# Patient Record
Sex: FEMALE | Race: WHITE | NOT HISPANIC OR LATINO | Employment: OTHER | URBAN - METROPOLITAN AREA
[De-identification: names, ages, dates, MRNs, and addresses within clinical notes are randomized per-mention and may not be internally consistent; named-entity substitution may affect disease eponyms.]

---

## 2023-03-20 DIAGNOSIS — K21.9 GASTROESOPHAGEAL REFLUX DISEASE WITHOUT ESOPHAGITIS: Primary | ICD-10-CM

## 2023-03-27 DIAGNOSIS — I10 BENIGN ESSENTIAL HTN: ICD-10-CM

## 2023-03-27 DIAGNOSIS — F41.9 ANXIETY: Primary | ICD-10-CM

## 2023-03-27 RX ORDER — DIAZEPAM 5 MG/1
5 TABLET ORAL NIGHTLY PRN
COMMUNITY
Start: 2021-06-10 | End: 2023-03-27 | Stop reason: SDUPTHER

## 2023-03-30 PROBLEM — E01.0 THYROMEGALY: Status: ACTIVE | Noted: 2023-03-30

## 2023-03-30 PROBLEM — E78.49 ESSENTIAL FAMILIAL HYPERLIPIDEMIA: Status: ACTIVE | Noted: 2023-03-30

## 2023-03-30 PROBLEM — M47.819 FACET ARTHROPATHY: Status: ACTIVE | Noted: 2023-03-30

## 2023-03-30 PROBLEM — K57.32 DIVERTICULITIS OF COLON: Status: ACTIVE | Noted: 2023-03-30

## 2023-03-30 PROBLEM — K20.90 ESOPHAGITIS: Status: ACTIVE | Noted: 2023-03-30

## 2023-03-30 PROBLEM — J32.9 SINUSITIS: Status: ACTIVE | Noted: 2023-03-30

## 2023-03-30 PROBLEM — E04.1 THYROID NODULE, COLD: Status: ACTIVE | Noted: 2023-03-30

## 2023-03-30 PROBLEM — F41.9 ANXIETY: Status: ACTIVE | Noted: 2023-03-30

## 2023-03-30 PROBLEM — M54.50 LOW BACK PAIN: Status: ACTIVE | Noted: 2023-03-30

## 2023-03-30 PROBLEM — G47.00 INSOMNIA: Status: ACTIVE | Noted: 2023-03-30

## 2023-03-30 PROBLEM — R07.89 CHEST DISCOMFORT: Status: ACTIVE | Noted: 2023-03-30

## 2023-03-30 PROBLEM — M25.511 ARTHRALGIA OF SHOULDER REGION, RIGHT: Status: ACTIVE | Noted: 2023-03-30

## 2023-03-30 PROBLEM — M79.10 MYALGIA: Status: ACTIVE | Noted: 2023-03-30

## 2023-03-30 PROBLEM — K21.9 GERD (GASTROESOPHAGEAL REFLUX DISEASE): Status: ACTIVE | Noted: 2023-03-30

## 2023-03-30 PROBLEM — M77.8 SHOULDER TENDONITIS: Status: ACTIVE | Noted: 2023-03-30

## 2023-03-30 PROBLEM — J30.9 ALLERGIC RHINITIS: Status: ACTIVE | Noted: 2023-03-30

## 2023-03-30 PROBLEM — I10 BENIGN ESSENTIAL HTN: Status: ACTIVE | Noted: 2023-03-30

## 2023-03-30 PROBLEM — R06.00 DYSPNEA: Status: ACTIVE | Noted: 2023-03-30

## 2023-03-30 PROBLEM — R07.89 ATYPICAL CHEST PAIN: Status: ACTIVE | Noted: 2023-03-30

## 2023-03-30 PROBLEM — R09.81 SINUS CONGESTION: Status: ACTIVE | Noted: 2023-03-30

## 2023-03-30 PROBLEM — R06.09 DYSPNEA ON EXERTION: Status: ACTIVE | Noted: 2023-03-30

## 2023-03-30 PROBLEM — E04.2 MULTINODULAR GOITER: Status: ACTIVE | Noted: 2023-03-30

## 2023-03-30 PROBLEM — L21.9 SEBORRHEA: Status: ACTIVE | Noted: 2023-03-30

## 2023-03-30 PROBLEM — R51.9 FREQUENT HEADACHES: Status: ACTIVE | Noted: 2023-03-30

## 2023-03-30 PROBLEM — R20.9 COLD SENSATION OF SKIN: Status: ACTIVE | Noted: 2023-03-30

## 2023-03-30 PROBLEM — M54.30 SCIATICA: Status: ACTIVE | Noted: 2023-03-30

## 2023-03-30 RX ORDER — DULOXETIN HYDROCHLORIDE 20 MG/1
1 CAPSULE, DELAYED RELEASE ORAL DAILY
COMMUNITY
Start: 2022-06-06 | End: 2023-08-24 | Stop reason: ALTCHOICE

## 2023-03-30 RX ORDER — IBUPROFEN 600 MG/1
1 TABLET ORAL DAILY
COMMUNITY
Start: 2017-01-04

## 2023-03-30 RX ORDER — OMEPRAZOLE 40 MG/1
1 CAPSULE, DELAYED RELEASE ORAL 2 TIMES DAILY
COMMUNITY
Start: 2014-07-28

## 2023-03-30 RX ORDER — PRAVASTATIN SODIUM 10 MG/1
1 TABLET ORAL DAILY
COMMUNITY
Start: 2018-11-14 | End: 2023-11-07 | Stop reason: SDUPTHER

## 2023-03-30 RX ORDER — LISINOPRIL 10 MG/1
1 TABLET ORAL DAILY
COMMUNITY
Start: 2015-11-18 | End: 2024-03-08 | Stop reason: SDUPTHER

## 2023-03-30 RX ORDER — HYDROCHLOROTHIAZIDE 25 MG/1
1 TABLET ORAL DAILY
COMMUNITY
Start: 2021-04-02 | End: 2023-06-14 | Stop reason: SDUPTHER

## 2023-03-30 RX ORDER — FLUTICASONE PROPIONATE 50 MCG
1 SPRAY, SUSPENSION (ML) NASAL 2 TIMES DAILY
COMMUNITY
End: 2024-06-06 | Stop reason: WASHOUT

## 2023-03-30 RX ORDER — HYDROCORTISONE 25 MG/G
CREAM TOPICAL
COMMUNITY
Start: 2021-07-29 | End: 2024-06-06 | Stop reason: WASHOUT

## 2023-03-31 ENCOUNTER — OFFICE VISIT (OUTPATIENT)
Dept: PRIMARY CARE | Facility: CLINIC | Age: 88
End: 2023-03-31
Payer: MEDICARE

## 2023-03-31 VITALS — SYSTOLIC BLOOD PRESSURE: 133 MMHG | RESPIRATION RATE: 12 BRPM | DIASTOLIC BLOOD PRESSURE: 76 MMHG | HEART RATE: 74 BPM

## 2023-03-31 DIAGNOSIS — K21.9 GASTROESOPHAGEAL REFLUX DISEASE WITHOUT ESOPHAGITIS: ICD-10-CM

## 2023-03-31 DIAGNOSIS — I10 BENIGN ESSENTIAL HTN: Primary | ICD-10-CM

## 2023-03-31 DIAGNOSIS — Z00.00 HEALTH CARE MAINTENANCE: ICD-10-CM

## 2023-03-31 DIAGNOSIS — H91.90 HEARING LOSS, UNSPECIFIED HEARING LOSS TYPE, UNSPECIFIED LATERALITY: ICD-10-CM

## 2023-03-31 PROCEDURE — 3075F SYST BP GE 130 - 139MM HG: CPT | Performed by: INTERNAL MEDICINE

## 2023-03-31 PROCEDURE — 3078F DIAST BP <80 MM HG: CPT | Performed by: INTERNAL MEDICINE

## 2023-03-31 PROCEDURE — 99213 OFFICE O/P EST LOW 20 MIN: CPT | Performed by: INTERNAL MEDICINE

## 2023-03-31 RX ORDER — DILTIAZEM HYDROCHLORIDE 120 MG/1
120 CAPSULE, COATED, EXTENDED RELEASE ORAL DAILY
Qty: 30 CAPSULE | Refills: 5 | Status: SHIPPED
Start: 2023-03-31 | End: 2023-04-01 | Stop reason: SDUPTHER

## 2023-03-31 NOTE — PROGRESS NOTES
OARRS:  No data recorded  I have personally reviewed the OARRS report for Elina Mathis. I have considered the risks of abuse, dependence, addiction and diversion    Is the patient prescribed a combination of a benzodiazepine and opioid?  No    Last Urine Drug Screen / ordered today: Yes  Recent Results (from the past 57474 hour(s))   Benzodiazepine Confirmation, Urine    Collection Time: 11/15/22  2:31 PM   Result Value Ref Range    7-Aminoclonazepam <25 Cutoff <25 ng/mL    Alpha-Hydroxyalprazolam <25 Cutoff <25 ng/mL    Alpha-Hydroxymidazolam <25 Cutoff <25 ng/mL    Alprazolam <25 Cutoff <25 ng/mL    Chlordiazepoxide <25 Cutoff <25 ng/mL    Clonazepam <25 Cutoff <25 ng/mL    Diazepam <25 Cutoff <25 ng/mL    Lorazepam <25 Cutoff <25 ng/mL    Midazolam <25 Cutoff <25 ng/mL    Nordiazepam 148 (A) Cutoff <25 ng/mL    Oxazepam 433 (A) Cutoff <25 ng/mL    Temazepam 628 (A) Cutoff <25 ng/mL   Drug Screen, Urine With Reflex to Confirmation    Collection Time: 11/15/22  2:31 PM   Result Value Ref Range    DRUG SCREEN COMMENT URINE SEE BELOW     Amphetamine Screen, Urine PRESUMPTIVE NEGATIVE NEGATIVE    Barbiturate Screen, Urine PRESUMPTIVE NEGATIVE NEGATIVE    BENZODIAZEPINE (PRESENCE) IN URINE BY SCREEN METHOD PRESUMPTIVE POSITIVE (A) NEGATIVE    Cannabinoid Screen, Urine PRESUMPTIVE NEGATIVE NEGATIVE    Cocaine Screen, Urine PRESUMPTIVE NEGATIVE NEGATIVE    Fentanyl, Ur PRESUMPTIVE NEGATIVE NEGATIVE    Methadone Screen, Urine PRESUMPTIVE NEGATIVE NEGATIVE    Opiate Screen, Urine PRESUMPTIVE NEGATIVE NEGATIVE    Oxycodone Screen, Ur PRESUMPTIVE NEGATIVE NEGATIVE    PCP Screen, Urine PRESUMPTIVE NEGATIVE NEGATIVE     Results are as expected.     Controlled Substance Agreement:  Date of the Last Agreement: 2022  Reviewed Controlled Substance Agreement including but not limited to the benefits, risks, and alternatives to treatment with a Controlled Substance medication(s).    Benzodiazepines:  What is the patient's goal of  therapy? antianxiety  Is this being achieved with current treatment? y    FALGUNI-7:  No data recorded    Activities of Daily Living:   Is your overall impression that this patient is benefiting (symptom reduction outweighs side effects) from benzodiazepine therapy? Yes     1. Physical Functioning: Same  2. Family Relationship: Same  3. Social Relationship: Same  4. Mood: Same  5. Sleep Patterns: Same  6. Overall Function: SameSubjective   Patient ID: Elina Mathis is a 87 y.o. female who presents for No chief complaint on file..    HPI sick visit same day after hours no staff no chest pain no shortness of breath discussed with upper GI and some gastroesophageal reflux disease she complains of that as well as some hearing loss would also like to see ear nose and throat discussed with esophageal spasm she had had other issues treated and would like to pursue this    Review of Systems    Objective   There were no vitals taken for this visit.    Physical Exam vital signs noted alert and oriented x3 NCAT color is good no JVD chest clear to auscultation CV regular rate and rhythm S1-S2 without murmur gallop or rub abdomen soft nontender normal active bowel sounds extremities no clubbing cyanosis or edema normal distal pulses    Assessment/Plan impression gastroesophageal reflux disease esophageal spasm hypertension hearing loss    Plan okay to refill diazepam when needed risk-benefit side effect reviewed with her and wishes to proceed check OARRS report reviewed urine medication screen medication agreement okay for diltiazem CD1 120 mg 1 p.o. every morning #30 refill 4 see EMR follow-up with gastroenterology if not improving okay for ENT referral by request advised on GERD is well as the hearing issue may also need audiology good overall diet try not to eat late at night continue with other medications continue with 20 mg of omeprazole instead of 40 mg she prefers the 20 mg and recheck 1 month

## 2023-04-01 RX ORDER — DILTIAZEM HYDROCHLORIDE 120 MG/1
120 CAPSULE, COATED, EXTENDED RELEASE ORAL DAILY
Qty: 30 CAPSULE | Refills: 0 | Status: SHIPPED | OUTPATIENT
Start: 2023-04-01 | End: 2023-05-01

## 2023-04-01 RX ORDER — DIAZEPAM 5 MG/1
5 TABLET ORAL NIGHTLY PRN
Qty: 30 TABLET | Refills: 0 | Status: SHIPPED | OUTPATIENT
Start: 2023-04-01 | End: 2023-07-07 | Stop reason: SDUPTHER

## 2023-06-01 ENCOUNTER — HOSPITAL ENCOUNTER (OUTPATIENT)
Dept: DATA CONVERSION | Facility: HOSPITAL | Age: 88
End: 2023-06-01
Attending: INTERNAL MEDICINE | Admitting: INTERNAL MEDICINE
Payer: MEDICARE

## 2023-06-01 DIAGNOSIS — K22.4 DYSKINESIA OF ESOPHAGUS: ICD-10-CM

## 2023-06-01 DIAGNOSIS — I10 ESSENTIAL (PRIMARY) HYPERTENSION: ICD-10-CM

## 2023-06-01 DIAGNOSIS — K21.9 GASTRO-ESOPHAGEAL REFLUX DISEASE WITHOUT ESOPHAGITIS: ICD-10-CM

## 2023-06-01 DIAGNOSIS — R13.10 DYSPHAGIA, UNSPECIFIED: ICD-10-CM

## 2023-06-14 DIAGNOSIS — I10 BENIGN ESSENTIAL HYPERTENSION: Primary | ICD-10-CM

## 2023-06-15 RX ORDER — HYDROCHLOROTHIAZIDE 25 MG/1
25 TABLET ORAL DAILY
Qty: 90 TABLET | Refills: 3 | Status: SHIPPED | OUTPATIENT
Start: 2023-06-15

## 2023-07-07 DIAGNOSIS — F41.9 ANXIETY: ICD-10-CM

## 2023-07-09 RX ORDER — DIAZEPAM 5 MG/1
5 TABLET ORAL NIGHTLY PRN
Qty: 30 TABLET | Refills: 0 | Status: SHIPPED | OUTPATIENT
Start: 2023-07-09 | End: 2023-10-17 | Stop reason: SDUPTHER

## 2023-07-24 ENCOUNTER — OFFICE VISIT (OUTPATIENT)
Dept: PRIMARY CARE | Facility: CLINIC | Age: 88
End: 2023-07-24
Payer: MEDICARE

## 2023-07-24 DIAGNOSIS — I10 BENIGN ESSENTIAL HTN: ICD-10-CM

## 2023-07-24 DIAGNOSIS — J20.9 ACUTE BRONCHITIS, UNSPECIFIED ORGANISM: ICD-10-CM

## 2023-07-24 DIAGNOSIS — F41.9 ANXIETY: Primary | ICD-10-CM

## 2023-07-24 PROCEDURE — 3075F SYST BP GE 130 - 139MM HG: CPT | Performed by: INTERNAL MEDICINE

## 2023-07-24 PROCEDURE — 99213 OFFICE O/P EST LOW 20 MIN: CPT | Performed by: INTERNAL MEDICINE

## 2023-07-24 PROCEDURE — 3078F DIAST BP <80 MM HG: CPT | Performed by: INTERNAL MEDICINE

## 2023-07-24 NOTE — PROGRESS NOTES
Subjective   Patient ID: Elina Mathis is a 87 y.o. female who presents for No chief complaint on file..    HPI follow-up visit no chest pain no shortness of breath she had seen the cardiologist no particular other testing was needed or offered she has been having a tickle in her throat with some cough and she thinks congestion or phlegm although unable to bring anything up has not had fever    Review of Systems    Objective   There were no vitals taken for this visit.    Physical Exam vital signs noted alert and oriented x3 NCAT color is good no JVD chest clear to auscultation and percussion no wheezing CV regular rate and rhythm S1-S2 without murmur gallop or rub abdomen soft nontender normal active bowel sounds extremities no clubbing cyanosis or edema normal distal pulses    Assessment/Plan impression anxiety other diagnoses acute bronchitis htn  Plan she is looking for additional medication that might be helpful for her symptom complex for now we will use Mucinex twice daily liquid or solid tablets readvised on medicines after review of cardiology notes (noted prior testing) recent GI and blood work studies (check) good diet regular exercise increase water consumption continue with bp medication  Review diazepam indications and needs and follow-up after that three months

## 2023-07-29 VITALS — DIASTOLIC BLOOD PRESSURE: 77 MMHG | SYSTOLIC BLOOD PRESSURE: 135 MMHG

## 2023-08-18 ENCOUNTER — TELEPHONE (OUTPATIENT)
Dept: PRIMARY CARE | Facility: CLINIC | Age: 88
End: 2023-08-18
Payer: MEDICARE

## 2023-08-20 DIAGNOSIS — Z00.00 HEALTH CARE MAINTENANCE: Primary | ICD-10-CM

## 2023-08-24 ENCOUNTER — TELEPHONE (OUTPATIENT)
Dept: PRIMARY CARE | Facility: CLINIC | Age: 88
End: 2023-08-24
Payer: MEDICARE

## 2023-08-24 RX ORDER — NORTRIPTYLINE HYDROCHLORIDE 10 MG/1
10 CAPSULE ORAL NIGHTLY
Qty: 30 CAPSULE | Refills: 2 | Status: SHIPPED | OUTPATIENT
Start: 2023-08-24 | End: 2023-11-22

## 2023-10-17 DIAGNOSIS — F41.9 ANXIETY: ICD-10-CM

## 2023-10-21 RX ORDER — DIAZEPAM 5 MG/1
5 TABLET ORAL NIGHTLY PRN
Qty: 30 TABLET | Refills: 0 | Status: SHIPPED | OUTPATIENT
Start: 2023-10-21 | End: 2024-01-23 | Stop reason: SDUPTHER

## 2023-11-07 DIAGNOSIS — Z00.00 HEALTH CARE MAINTENANCE: Primary | ICD-10-CM

## 2023-11-08 RX ORDER — PRAVASTATIN SODIUM 10 MG/1
10 TABLET ORAL DAILY
Qty: 90 TABLET | Refills: 1 | Status: SHIPPED | OUTPATIENT
Start: 2023-11-08 | End: 2024-05-08 | Stop reason: SDUPTHER

## 2024-01-08 ENCOUNTER — OFFICE VISIT (OUTPATIENT)
Dept: PRIMARY CARE | Facility: CLINIC | Age: 89
End: 2024-01-08
Payer: COMMERCIAL

## 2024-01-08 VITALS — SYSTOLIC BLOOD PRESSURE: 132 MMHG | DIASTOLIC BLOOD PRESSURE: 74 MMHG

## 2024-01-08 DIAGNOSIS — I10 BENIGN ESSENTIAL HTN: ICD-10-CM

## 2024-01-08 DIAGNOSIS — Z00.00 HEALTH CARE MAINTENANCE: Primary | ICD-10-CM

## 2024-01-08 DIAGNOSIS — J04.0 LARYNGITIS, ACUTE: ICD-10-CM

## 2024-01-08 DIAGNOSIS — R07.89 ATYPICAL CHEST PAIN: ICD-10-CM

## 2024-01-08 PROCEDURE — 3075F SYST BP GE 130 - 139MM HG: CPT | Performed by: INTERNAL MEDICINE

## 2024-01-08 PROCEDURE — 99213 OFFICE O/P EST LOW 20 MIN: CPT | Performed by: INTERNAL MEDICINE

## 2024-01-08 PROCEDURE — 1126F AMNT PAIN NOTED NONE PRSNT: CPT | Performed by: INTERNAL MEDICINE

## 2024-01-08 PROCEDURE — 3078F DIAST BP <80 MM HG: CPT | Performed by: INTERNAL MEDICINE

## 2024-01-08 RX ORDER — BENZONATATE 100 MG/1
100 CAPSULE ORAL 3 TIMES DAILY PRN
Qty: 42 CAPSULE | Refills: 0 | Status: SHIPPED | OUTPATIENT
Start: 2024-01-08 | End: 2024-02-07

## 2024-01-08 NOTE — PROGRESS NOTES
Subjective   Patient ID: Elina Mathis is a 88 y.o. female who presents for No chief complaint on file..    HPI congestionSick visit has been to the urgent care on 2 occasions had some upper bronchial soreness to her voice and coughing after taking doxycycline and then some Mucinex no wheezing no fever ongoing chest issues that she has had for decades no impervious to previous workups or medication but not clearly related to breathing heart or gastrointestinal distress    Review of Systems    Objective   There were no vitals taken for this visit.    Physical Exam vital signs noted alert and oriented x 3 NCAT no coryza nares without discharge OP benign TM normal opaque bilateral EAC clear bilateral no AC nodes no JVD chest clear to auscultation no wheezing no crackles CV regular rate and rhythm S1-S2 without murmur gallop or rub voice is hoarse extremities no clubbing cyanosis or edema normal distal pulses    Assessment/Plan impression no acute bronchitis with laryngitis hypertension?  Chest pain chronic  Plan discussed with medications for her chest may use Tylenol or try to avoid astringents for her esophagus okay for plain Mucinex twice daily okay for prescription of Tessalon Perles for preempting the cough and decreasing irritation there see EMR no further antibiotics no steroids are needed had tested negative for COVID had a chest x-ray at the urgent care negative for pneumonia follow-up for regular visit [facility] if no better

## 2024-01-23 DIAGNOSIS — F41.9 ANXIETY: ICD-10-CM

## 2024-01-24 DIAGNOSIS — Z79.899 MEDICATION MANAGEMENT: Primary | ICD-10-CM

## 2024-01-24 RX ORDER — DIAZEPAM 5 MG/1
TABLET ORAL
Qty: 10 TABLET | Refills: 0 | Status: SHIPPED | OUTPATIENT
Start: 2024-01-24 | End: 2024-02-23 | Stop reason: SDUPTHER

## 2024-02-23 ENCOUNTER — LAB (OUTPATIENT)
Dept: LAB | Facility: LAB | Age: 89
End: 2024-02-23
Payer: COMMERCIAL

## 2024-02-23 ENCOUNTER — OFFICE VISIT (OUTPATIENT)
Dept: PRIMARY CARE | Facility: CLINIC | Age: 89
End: 2024-02-23
Payer: COMMERCIAL

## 2024-02-23 VITALS — DIASTOLIC BLOOD PRESSURE: 78 MMHG | SYSTOLIC BLOOD PRESSURE: 134 MMHG

## 2024-02-23 DIAGNOSIS — M54.50 CHRONIC LOW BACK PAIN, UNSPECIFIED BACK PAIN LATERALITY, UNSPECIFIED WHETHER SCIATICA PRESENT: ICD-10-CM

## 2024-02-23 DIAGNOSIS — E78.49 ESSENTIAL FAMILIAL HYPERLIPIDEMIA: ICD-10-CM

## 2024-02-23 DIAGNOSIS — Z00.00 HEALTH CARE MAINTENANCE: Primary | ICD-10-CM

## 2024-02-23 DIAGNOSIS — G89.29 CHRONIC LOW BACK PAIN, UNSPECIFIED BACK PAIN LATERALITY, UNSPECIFIED WHETHER SCIATICA PRESENT: ICD-10-CM

## 2024-02-23 DIAGNOSIS — I10 BENIGN ESSENTIAL HTN: ICD-10-CM

## 2024-02-23 DIAGNOSIS — K21.9 GASTROESOPHAGEAL REFLUX DISEASE WITHOUT ESOPHAGITIS: ICD-10-CM

## 2024-02-23 DIAGNOSIS — F41.9 ANXIETY: ICD-10-CM

## 2024-02-23 DIAGNOSIS — Z79.899 MEDICATION MANAGEMENT: ICD-10-CM

## 2024-02-23 LAB
ALBUMIN SERPL BCP-MCNC: 4.2 G/DL (ref 3.4–5)
ALP SERPL-CCNC: 78 U/L (ref 33–136)
ALT SERPL W P-5'-P-CCNC: 16 U/L (ref 7–45)
AMPHETAMINES UR QL SCN: ABNORMAL
ANION GAP SERPL CALC-SCNC: 15 MMOL/L (ref 10–20)
AST SERPL W P-5'-P-CCNC: 19 U/L (ref 9–39)
BARBITURATES UR QL SCN: ABNORMAL
BENZODIAZ UR QL SCN: ABNORMAL
BILIRUB SERPL-MCNC: 0.4 MG/DL (ref 0–1.2)
BUN SERPL-MCNC: 17 MG/DL (ref 6–23)
BZE UR QL SCN: ABNORMAL
CALCIUM SERPL-MCNC: 10.1 MG/DL (ref 8.6–10.6)
CANNABINOIDS UR QL SCN: ABNORMAL
CHLORIDE SERPL-SCNC: 101 MMOL/L (ref 98–107)
CHOLEST SERPL-MCNC: 215 MG/DL (ref 0–199)
CHOLESTEROL/HDL RATIO: 2.5
CO2 SERPL-SCNC: 28 MMOL/L (ref 21–32)
CREAT SERPL-MCNC: 0.78 MG/DL (ref 0.5–1.05)
EGFRCR SERPLBLD CKD-EPI 2021: 74 ML/MIN/1.73M*2
ERYTHROCYTE [DISTWIDTH] IN BLOOD BY AUTOMATED COUNT: 13.2 % (ref 11.5–14.5)
FENTANYL+NORFENTANYL UR QL SCN: ABNORMAL
GLUCOSE SERPL-MCNC: 96 MG/DL (ref 74–99)
HCT VFR BLD AUTO: 42.7 % (ref 36–46)
HDLC SERPL-MCNC: 85 MG/DL
HGB BLD-MCNC: 14.5 G/DL (ref 12–16)
LDLC SERPL CALC-MCNC: 107 MG/DL
MCH RBC QN AUTO: 30.5 PG (ref 26–34)
MCHC RBC AUTO-ENTMCNC: 34 G/DL (ref 32–36)
MCV RBC AUTO: 90 FL (ref 80–100)
NON HDL CHOLESTEROL: 130 MG/DL (ref 0–149)
NRBC BLD-RTO: 0 /100 WBCS (ref 0–0)
OPIATES UR QL SCN: ABNORMAL
OXYCODONE+OXYMORPHONE UR QL SCN: ABNORMAL
PCP UR QL SCN: ABNORMAL
PLATELET # BLD AUTO: 303 X10*3/UL (ref 150–450)
POTASSIUM SERPL-SCNC: 4.3 MMOL/L (ref 3.5–5.3)
PROT SERPL-MCNC: 6.9 G/DL (ref 6.4–8.2)
RBC # BLD AUTO: 4.75 X10*6/UL (ref 4–5.2)
SODIUM SERPL-SCNC: 140 MMOL/L (ref 136–145)
TRIGL SERPL-MCNC: 117 MG/DL (ref 0–149)
VLDL: 23 MG/DL (ref 0–40)
WBC # BLD AUTO: 6.4 X10*3/UL (ref 4.4–11.3)

## 2024-02-23 PROCEDURE — 1126F AMNT PAIN NOTED NONE PRSNT: CPT | Performed by: INTERNAL MEDICINE

## 2024-02-23 PROCEDURE — 3078F DIAST BP <80 MM HG: CPT | Performed by: INTERNAL MEDICINE

## 2024-02-23 PROCEDURE — 80061 LIPID PANEL: CPT

## 2024-02-23 PROCEDURE — 3075F SYST BP GE 130 - 139MM HG: CPT | Performed by: INTERNAL MEDICINE

## 2024-02-23 PROCEDURE — 80307 DRUG TEST PRSMV CHEM ANLYZR: CPT

## 2024-02-23 PROCEDURE — 80053 COMPREHEN METABOLIC PANEL: CPT

## 2024-02-23 PROCEDURE — 99214 OFFICE O/P EST MOD 30 MIN: CPT | Performed by: INTERNAL MEDICINE

## 2024-02-23 PROCEDURE — 80346 BENZODIAZEPINES1-12: CPT

## 2024-02-23 PROCEDURE — 85027 COMPLETE CBC AUTOMATED: CPT

## 2024-02-23 PROCEDURE — 36415 COLL VENOUS BLD VENIPUNCTURE: CPT

## 2024-02-23 RX ORDER — DIAZEPAM 5 MG/1
TABLET ORAL
Qty: 30 TABLET | Refills: 0 | Status: SHIPPED | OUTPATIENT
Start: 2024-02-23 | End: 2024-06-03 | Stop reason: SDUPTHER

## 2024-02-23 NOTE — PROGRESS NOTES
Subjective   Patient ID: Elina Mathis is a 88 y.o. female who presents for No chief complaint on file..    HPI Follow-up visit no chest pain no shortness of breath no side effect with medication after her most recent bronchial she is back to her usual activities stamina is still just okay she uses the Valium about twice per week bowels okay no dysuria has had some lower back discomfort but no injury risk-benefit side effect of diazepam reviewed with her and wishes to proceed    Review of Systems    Objective   There were no vitals taken for this visit.    Physical Exam vital signs noted alert and oriented x 3 NCAT color is good no JVD chest clear to auscultation CV regular rate and rhythm S1-S2 without murmur gallop or rub LS spine normal curvature nontender spinous process somewhat tight posterior paraspinal muscles negative straight leg raise extremities no clubbing cyanosis or edema normal distal pulses DTR 2+ musculoskeletal DJD changes hands    Assessment/Plan impression hypertension low back pain hyperlipidemia GERD anxiety insomnia osteoarthritis  Plan previous urine medication screening test was ordered she will obtain today check Chem-7 vies on glucose potassium and kidney function advised on blood pressure blood pressure medicine check hepatic panel lipid panel + liver enzymes as well as cholesterol profile and medication check CBC advised on blood count continue with PPI if helpful avoidance of NSAIDs May recheck for screening at any other point in time range of motion exercises for the hands Tylenol as needed for the low back okay for Salonpas patch for topical agent which she prefers to oral sleep hygiene she prefers not to take melatonin or Benadryl recheck regular visit 3 months TT 40 cc 21

## 2024-02-29 LAB
1OH-MIDAZOLAM UR CFM-MCNC: <25 NG/ML
7AMINOCLONAZEPAM UR CFM-MCNC: <25 NG/ML
A-OH ALPRAZ UR CFM-MCNC: <25 NG/ML
ALPRAZ UR CFM-MCNC: <25 NG/ML
CHLORDIAZEP UR CFM-MCNC: <25 NG/ML
CLONAZEPAM UR CFM-MCNC: <25 NG/ML
DIAZEPAM UR CFM-MCNC: <25 NG/ML
LORAZEPAM UR CFM-MCNC: <25 NG/ML
MIDAZOLAM UR CFM-MCNC: <25 NG/ML
NORDIAZEPAM UR CFM-MCNC: 135 NG/ML
OXAZEPAM UR CFM-MCNC: 336 NG/ML
TEMAZEPAM UR CFM-MCNC: 484 NG/ML

## 2024-03-08 DIAGNOSIS — I10 BENIGN ESSENTIAL HTN: Primary | ICD-10-CM

## 2024-03-08 RX ORDER — LISINOPRIL 10 MG/1
10 TABLET ORAL DAILY
Qty: 90 TABLET | Refills: 1 | Status: SHIPPED | OUTPATIENT
Start: 2024-03-08

## 2024-05-08 DIAGNOSIS — Z00.00 HEALTH CARE MAINTENANCE: ICD-10-CM

## 2024-05-09 RX ORDER — PRAVASTATIN SODIUM 10 MG/1
10 TABLET ORAL DAILY
Qty: 90 TABLET | Refills: 1 | Status: SHIPPED | OUTPATIENT
Start: 2024-05-09

## 2024-05-14 ENCOUNTER — OFFICE VISIT (OUTPATIENT)
Dept: PRIMARY CARE | Facility: CLINIC | Age: 89
End: 2024-05-14
Payer: COMMERCIAL

## 2024-05-14 VITALS — SYSTOLIC BLOOD PRESSURE: 137 MMHG | DIASTOLIC BLOOD PRESSURE: 77 MMHG

## 2024-05-14 DIAGNOSIS — J01.90 ACUTE SINUSITIS, RECURRENCE NOT SPECIFIED, UNSPECIFIED LOCATION: ICD-10-CM

## 2024-05-14 DIAGNOSIS — I10 BENIGN ESSENTIAL HTN: ICD-10-CM

## 2024-05-14 DIAGNOSIS — H81.10 BENIGN PAROXYSMAL POSITIONAL VERTIGO, UNSPECIFIED LATERALITY: Primary | ICD-10-CM

## 2024-05-14 PROCEDURE — 3075F SYST BP GE 130 - 139MM HG: CPT | Performed by: INTERNAL MEDICINE

## 2024-05-14 PROCEDURE — 3078F DIAST BP <80 MM HG: CPT | Performed by: INTERNAL MEDICINE

## 2024-05-14 PROCEDURE — 99213 OFFICE O/P EST LOW 20 MIN: CPT | Performed by: INTERNAL MEDICINE

## 2024-05-14 NOTE — PROGRESS NOTES
Subjective   Patient ID: Elina Mathis is a 88 y.o. female who presents for No chief complaint on file..    Follow-up visit and sick visit no chest pain no shortness of breath has been having some vertigo especially with positional changes but also has been having some  HPI signs this discharge she has high amounts of sodium in her diet discussed with for blood pressure as well as the vertigo    Review of Systems    Objective   There were no vitals taken for this visit.    Physical Exam vital signs noted alert and oriented x 3 NCAT cranial nerves II through XII intact PERRLA EOMI nares without discharge OP benign TM normal bilateral EAC clear bilateral no AC nodes no JVD or bruit no thyromegaly chest clear to auscultation and percussion CV regular rate and rhythm S1-S2 without murmur gallop or rub extremities no clubbing cyanosis or edema normal distal pulses DTR 2+ no tremor    Assessment/Plan    impression hypertension BPV sinus  Plan okay for oral antihistamine such as Claritin 10 mg p.o. daily okay for lower salt diet slow positional change good water consumption okay for ENT referral by request see EMR continue with blood pressure medication including hydrochlorothiazide review prior laboratory results are potassium and kidney function and recheck based on above    Review blood test for potassium

## 2024-05-17 ENCOUNTER — APPOINTMENT (OUTPATIENT)
Dept: PRIMARY CARE | Facility: CLINIC | Age: 89
End: 2024-05-17
Payer: COMMERCIAL

## 2024-06-03 DIAGNOSIS — F41.9 ANXIETY: ICD-10-CM

## 2024-06-06 ENCOUNTER — OFFICE VISIT (OUTPATIENT)
Dept: OTOLARYNGOLOGY | Facility: CLINIC | Age: 89
End: 2024-06-06
Payer: COMMERCIAL

## 2024-06-06 VITALS
TEMPERATURE: 98.2 F | SYSTOLIC BLOOD PRESSURE: 139 MMHG | BODY MASS INDEX: 23.66 KG/M2 | DIASTOLIC BLOOD PRESSURE: 80 MMHG | HEIGHT: 65 IN | HEART RATE: 81 BPM

## 2024-06-06 DIAGNOSIS — R42 DIZZINESS: ICD-10-CM

## 2024-06-06 DIAGNOSIS — R09.81 NASAL CONGESTION: ICD-10-CM

## 2024-06-06 DIAGNOSIS — R26.81 UNSTEADINESS: ICD-10-CM

## 2024-06-06 DIAGNOSIS — R44.8 FACIAL PRESSURE: ICD-10-CM

## 2024-06-06 DIAGNOSIS — H90.3 SENSORINEURAL HEARING LOSS (SNHL) OF BOTH EARS: ICD-10-CM

## 2024-06-06 DIAGNOSIS — R26.89 IMBALANCE: Primary | ICD-10-CM

## 2024-06-06 PROCEDURE — 1036F TOBACCO NON-USER: CPT | Performed by: NURSE PRACTITIONER

## 2024-06-06 PROCEDURE — 1126F AMNT PAIN NOTED NONE PRSNT: CPT | Performed by: NURSE PRACTITIONER

## 2024-06-06 PROCEDURE — 3075F SYST BP GE 130 - 139MM HG: CPT | Performed by: NURSE PRACTITIONER

## 2024-06-06 PROCEDURE — 3079F DIAST BP 80-89 MM HG: CPT | Performed by: NURSE PRACTITIONER

## 2024-06-06 PROCEDURE — 1159F MED LIST DOCD IN RCRD: CPT | Performed by: NURSE PRACTITIONER

## 2024-06-06 PROCEDURE — 99204 OFFICE O/P NEW MOD 45 MIN: CPT | Performed by: NURSE PRACTITIONER

## 2024-06-06 PROCEDURE — 99214 OFFICE O/P EST MOD 30 MIN: CPT | Performed by: NURSE PRACTITIONER

## 2024-06-06 RX ORDER — FLUTICASONE PROPIONATE 50 MCG
SPRAY, SUSPENSION (ML) NASAL
Qty: 16 G | Refills: 1 | Status: SHIPPED | OUTPATIENT
Start: 2024-06-06

## 2024-06-06 RX ORDER — DIAZEPAM 5 MG/1
TABLET ORAL
Qty: 30 TABLET | Refills: 0 | Status: SHIPPED | OUTPATIENT
Start: 2024-06-06

## 2024-06-06 SDOH — ECONOMIC STABILITY: FOOD INSECURITY: WITHIN THE PAST 12 MONTHS, THE FOOD YOU BOUGHT JUST DIDN'T LAST AND YOU DIDN'T HAVE MONEY TO GET MORE.: NEVER TRUE

## 2024-06-06 SDOH — ECONOMIC STABILITY: FOOD INSECURITY: WITHIN THE PAST 12 MONTHS, YOU WORRIED THAT YOUR FOOD WOULD RUN OUT BEFORE YOU GOT MONEY TO BUY MORE.: NEVER TRUE

## 2024-06-06 ASSESSMENT — LIFESTYLE VARIABLES
HOW OFTEN DO YOU HAVE A DRINK CONTAINING ALCOHOL: 4 OR MORE TIMES A WEEK
HOW MANY STANDARD DRINKS CONTAINING ALCOHOL DO YOU HAVE ON A TYPICAL DAY: 1 OR 2
AUDIT-C TOTAL SCORE: 4
SKIP TO QUESTIONS 9-10: 1
HOW OFTEN DO YOU HAVE SIX OR MORE DRINKS ON ONE OCCASION: NEVER

## 2024-06-06 ASSESSMENT — PATIENT HEALTH QUESTIONNAIRE - PHQ9
SUM OF ALL RESPONSES TO PHQ9 QUESTIONS 1 AND 2: 0
2. FEELING DOWN, DEPRESSED OR HOPELESS: NOT AT ALL
1. LITTLE INTEREST OR PLEASURE IN DOING THINGS: NOT AT ALL

## 2024-06-06 ASSESSMENT — ENCOUNTER SYMPTOMS
DEPRESSION: 0
OCCASIONAL FEELINGS OF UNSTEADINESS: 0
LOSS OF SENSATION IN FEET: 0

## 2024-06-06 ASSESSMENT — COLUMBIA-SUICIDE SEVERITY RATING SCALE - C-SSRS
6. HAVE YOU EVER DONE ANYTHING, STARTED TO DO ANYTHING, OR PREPARED TO DO ANYTHING TO END YOUR LIFE?: NO
2. HAVE YOU ACTUALLY HAD ANY THOUGHTS OF KILLING YOURSELF?: NO
1. IN THE PAST MONTH, HAVE YOU WISHED YOU WERE DEAD OR WISHED YOU COULD GO TO SLEEP AND NOT WAKE UP?: NO

## 2024-06-06 ASSESSMENT — PAIN SCALES - GENERAL: PAINLEVEL: 0-NO PAIN

## 2024-06-06 NOTE — PROGRESS NOTES
Subjective   Patient ID: Elina Mathis is a 88 y.o. female who presents for Dizziness.    HPI  Patient here for dizziness that has been going on the last 1-2 months. She describes lightheadedness and feeling wobbly. She has bad arthritis. Denies room spinning vertigo. The feeling is constant, like she is weak. Denies blurry vision, and double vision. She has had headaches her whole life.   Bright lights and loud sounds do not make the patient dizzy. Denies pressure induced dizziness. Denies autophony. Denies positional vertigo. No falls.     She feels she has hearing loss. Denies ear pain, drainage, and tinnitus. Denies previous ear surgery, loud noise exposure, and family history of hearing loss.      She has pressure/fullness in the left side of her face for years. Denies nasal congestion. Throat feels raw and she gets dry mouth.  She takes Allegra which helps some. Has tried sinus rinses in the past and nasal sprays with no improvement.     Patient Active Problem List   Diagnosis    Allergic rhinitis    Anxiety    Arthralgia of shoulder region, right    Atypical chest pain    Benign essential HTN    Chest discomfort    Cold sensation of skin    Diverticulitis of colon    Dyspnea    Dyspnea on exertion    Esophagitis    Essential familial hyperlipidemia    Facet arthropathy    Frequent headaches    GERD (gastroesophageal reflux disease)    Insomnia    Low back pain    Multinodular goiter    Myalgia    Sciatica    Seborrhea    Shoulder tendonitis    Sinus congestion    Thyroid nodule, cold    Thyromegaly    Sinusitis     History reviewed. No pertinent surgical history.    Review of Systems    All other systems have been reviewed and are negative for complaints except for those mentioned in history of present illness, past medical history and problem list.    Objective   Physical Exam    Constitutional: No fever, chills, weight loss or weight gain  General appearance: Appears well, well-nourished, well groomed. No  acute distress.    Communication: Normal communication    Psychiatric: Oriented to person, place and time. Normal mood and affect.    Neurologic: Cranial nerves II-XII grossly intact and symmetric bilaterally.    Head and Face:  Head: Atraumatic with no masses, lesions or scarring.  Face: Normal symmetry. No scars or deformities.  TMJ: Normal, no trismus.    Eyes: Conjunctiva not edematous or erythematous. PERRLA    Right Ear: External inspection of ear with no deformity, scars, or masses. EAC is clear.  TM is intact with no sign of infection, effusion, or retraction.  No perforation seen.     Left Ear: External inspection of ear with no deformity, scars, or masses. EAC is clear.  TM is intact with no sign of infection, effusion, or retraction.  No perforation seen.     Nose: External inspection of nose: No nasal lesions, lacerations or scars. Anterior rhinoscopy with limited visualization past the inferior turbinates. No tenderness on frontal or maxillary sinus palpation.    Oral Cavity/Mouth: Oral cavity and oropharynx mucosa moist and pink. No lesions or masses. Dentition normal. Tonsils appear normal. Uvula is midline. Tongue with no masses or lesions. Tongue with good mobility. The oropharynx is clear.    Neck: Normal appearing, symmetric, trachea midline.     Cardiovascular: Examination of peripheral vascular system shows no clubbing or cyanosis.    Respiratory: No respiratory distress increased work of breathing. Inspection of the chest with symmetric chest expansion and normal respiratory effort.    Skin: No head and neck rashes.    Lymph nodes: No adenopathy.     On vestibular exam, oculomotor function assessment shows normal ocular pursuits and saccades. There is no spontaneous nystagmus. Head Thrust test is negative bilaterally. These tests are slightly off, but I feel it's due to patient not understanding the instructions well.   Postural testing performed. Romberg is positive for any obvious  weakness/sway. Tandem Gait is negative for any obvious weakness/sway.     Diagnostic Results   I personally interpreted audiogram:      Assessment/Plan   Diagnoses and all orders for this visit:  Imbalance, dizziness, unsteadiness  -     Referral to Physical Therapy; Future  Nasal congestion, Facial pressure  -     fluticasone (Flonase) 50 mcg/actuation nasal spray; Administer 2 sprays into each nostril, once daily.  - Start sinus rinses as well.  - Follow up in 8 weeks. If not improving will perform nasal endoscopy.   Sensorineural hearing loss (SNHL) of both ears         - Repeat audiogram annually.     All questions answered to patient satisfaction.          JUAN PABLO Yost-CNP 06/06/24 1:45 PM

## 2024-06-06 NOTE — PATIENT INSTRUCTIONS
- Start sinus rinses. This can be purchased over the counter. See handout for details.   - I recommended Flonase 2 sprays each nostril once a day. Patient was given instruction on proper application of the medication by spraying intranasally and aiming towards the outer corners of the eyes. Patient was instructed to avoid the septum due to the risk of nasal bleeding. This was sent to the pharmacy.   - Schedule vestibular therapy by calling [phone].  - Follow up in 8 weeks.     Welcome to [facility] clinic. We are here to assist you through your ENT care at [facility].  Maegan is a Nurse Practitioner who specializes in General ENT. This means that she specializes in taking care of patients with usual ENT issues such as nasal congestion, allergy symptoms, sinusitis, hearing loss, ear infections, ear wax removal, hoarseness, sore throat, throat infections, reflux and some swallowing issues. She also sees patients regarding dizziness and vertigo.   Rachel is Maegan's  and she answers the office phone from 7:30am-4pm Mon-Fri. Call [phone]. She can help you with scheduling of appointments, and general questions and information. You may need to leave a message if she is helping another patient. In this case, someone from the team will call you back the same day if you leave your message before 3pm, or the next business morning.  Maegan currently sees patients at [facility] [place] on Mondays, Wednesdays and Thursdays.  She works closely with audiologists to solve issues with hearing. She is also in very close contact with her collaborative physicians. Dr. Pires, a surgeon who specializes in general ENT and rhinology. She also works closely with otologist (ear surgeon) Dr. Streeter and head and neck surgery Dr. Thompson.   Others who may be included in your care are dieticians, social workers, allergists, gastroenterologists, neurologists, and physical therapists.  Maegan will provide these referrals as needed. Please let her know if you would like to request a specific referral.  Maegan makes every effort to run on time for your appointments. Therefore, if you are more than 15 minutes late unrelated to a scan or another appointment such as therapy or audiology, your appointment will need to be rescheduled for another day. We appreciate your understanding.   We look forward to working with you to meet your healthcare goals.

## 2024-07-03 ENCOUNTER — CLINICAL SUPPORT (OUTPATIENT)
Dept: PHYSICAL THERAPY | Facility: CLINIC | Age: 89
End: 2024-07-03
Payer: COMMERCIAL

## 2024-07-03 DIAGNOSIS — R42 DIZZINESS: ICD-10-CM

## 2024-07-03 DIAGNOSIS — R26.81 UNSTEADINESS: ICD-10-CM

## 2024-07-03 DIAGNOSIS — R26.89 IMBALANCE: ICD-10-CM

## 2024-07-03 PROCEDURE — 97161 PT EVAL LOW COMPLEX 20 MIN: CPT | Mod: GP

## 2024-07-03 PROCEDURE — 97112 NEUROMUSCULAR REEDUCATION: CPT | Mod: GP

## 2024-07-03 ASSESSMENT — PATIENT HEALTH QUESTIONNAIRE - PHQ9
2. FEELING DOWN, DEPRESSED OR HOPELESS: NOT AT ALL
1. LITTLE INTEREST OR PLEASURE IN DOING THINGS: NOT AT ALL
SUM OF ALL RESPONSES TO PHQ9 QUESTIONS 1 AND 2: 0

## 2024-07-03 NOTE — PROGRESS NOTES
Physical Therapy    Physical Therapy Evaluation and Treatment      Patient Name: Elina Mathis  MRN: [id number]  Today's Date: 7/3/2024  Time Calculation  Start Time: 1615  Stop Time: 1705  Time Calculation (min): 50 min    Insurance - reviewed   Visit number: 1 (updated 07/03/24)   Payor: [employer] [facility] / Plan: [employer] [place] / Product Type: *No Product type* /    VISITS ARE MED NEC NO AUTH NEEDED PAYS %   Referring Provider: Maegan Brady, APR*       Assessment:      Elina Mathis is a 88 y.o. old female who presents to PT vestibular evaluation due to dizziness which she describes as unsteadiness associated with functional mobility.  Elina impairments include: balance deficits, gait deficits, and decreased functional mobility.   Due to these impairments, Elina has the following functional limitations and participation restrictions:  increased fall risk, difficulty with ambulation, difficulty performing household activities, difficulty performing recreational activities, and difficulty with completing/performing some ADLs/IADLs Elina's clinical presentation is Stable and/or uncomplicated characteristics with the level of complexity being low Elina's potential for rehab is good.    Tests for positional vertigo were negative. Tests and measures performed and she is at increased fall risk based on modCTSIB score with reproduction of familiar dizziness.  Signs and symptoms consistent with probable UVL, but unable to accurately assess DVA/head thrust due to cognition/guarding. Will continue to assess in subsequent sessions. No other consistent abnormalities noted with oculomotor assessment today, however max cues for technique and some difficulty with understanding of testing.   Skilled vestibular PT warranted to address intermittent dizziness in order to improve Elina Mathis's functional independence and safety at home and in the community as well as decrease  "fall risk. Skilled physical therapy services are appropriate and beneficial in order to achieve measurable and meaningful change in the objective tests and measures. Utilization of skilled physical therapy services will aid in advancing her functional status and attaining her therapy-related goals. Elina verbalized understanding and is in agreement with all goals and plan of care.  Elina left session with all questions answered and no increase in symptoms.       Plan:  OP PT Plan  Treatment/Interventions: Canalith repositioning, Education/ Instruction, Gait training, Neuromuscular re-education, Self care/ home management, Therapeutic activities, Therapeutic exercises, Manual therapy (precaution to manual: further assessment of c-spine warranted prior to mobs)  PT Plan: Skilled PT  PT Frequency:  (1-2 times per week)  Duration: 4-8 weeks  Rehab Potential: Good  Plan of Care Agreement: Patient    Current Problem:   Problem List Items Addressed This Visit    None  Visit Diagnoses         Codes    Dizziness     R42    Relevant Orders    Follow Up In Physical Therapy    Imbalance     R26.89    Relevant Orders    Follow Up In Physical Therapy    Unsteadiness     R26.81    Relevant Orders    Follow Up In Physical Therapy              Subjective    General:       Elina Mathis  is a 88 y.o. female  presenting to the clinic with chief complaint of intermittent dizziness x 3 months which she describes as \"wobbly\" or unsteadiness.  Only associated with movement.   Denies any vertigo/spinning.  But does feel more wobbly unsteady when getting out of bed.  Denies any falls    Elina Mathis  reports symptoms began 3 months ago.  Denies any previous hx of dizziness before.     Reports symptoms are better with not moving.    Reports symptoms are worse with moving.    Elina Mathis  denies:  numbness, tingling, diplopia, drop attacks, dysarthria, dysphagia, unexpected weight loss within the past 4 weeks, and unexpected weight gain within " "the past 4 weeks    Elina Mathis  confirms: dizziness    Prior Treatment/diagnostic images: none    Medical History Form: Reviewed (scanned into chart)    Living Environment: 3rd floor condo with elevator    Occupation: Retired      Prior Level of Function: IND, cooks, cleans, drives    Functional Limitations: limits activity due to unsteadiness.     Pt goals for therapy:  to \"feel better\"    Precautions:  Fall Risk: Moderate   Denies: Cancer, Pacemaker, Diabetes, latex allergy, other known cardiac problems, other known neurologic problem, and other known pulmonary problems  Past Surgical history: No pertinent surgical history   Past Medical history: Hypertension, anxiety, dyspnea on exertion, hyperlipidemia, HA, LBP  Wears bifocals, denies any previous ear sx    Pain:  9/10  pain location: back arthritis       Objective     Oculomotor Exam: **max cues for technique required with all objective test and measures   Extraocular ROM = WNL with max cues for technique  Smooth pursuits = WNL at slower speeds with max cues for technique and no saccades  Horizontal saccades = WNL at slower speed with max cues for technique   Vertical saccades = WNL at slower speed with max cues for technique   Convergence = WNL  Cover/uncover = WNL  Cross Cover = WNL  Spontaneous Nystagmus (with goggles) = negative  Gaze Evoked Nystagmus (with goggles) = negative  VOR =WNL  Post Head Shake Nystagmus Horizontal (with goggles) = negative    Positional Testing: with goggles  Tricia-Hallpike Right = negative   Charlotte-Hallpike Left = negative   Horizontal Roll Test Right = negative   Horizontal Roll Test Left = negative  Dynamic Visual Acuity = unable to accurately assess due to guarding    Modified Clinical Test of Sensory Interaction in Balance:      Eyes Open on land = 30 seconds with min sway  Eyes Closed on land = 30 seconds with mod sway and familiar dizziness-Failure to maintain balance with eye closed on firm surface indicates visually " dependent.   Eyes Open on airex = 30 seconds with min-mod sway and dizziness/wobbliness- Failure to maintain balance on foam surface indicate that visual and/or vestibular system is not be being used to maintain balance.    Eyes Closed on airex = 4 seconds best trial with LOB to the L - Inability to maintain standing on foam with eyes closed predicted future multiple falls  94/120 = scores < 120 indicate increased risk for falls    Gait:  unsteady reaching for objects with slow and cautious gait    Transfers: IND but unsteady when standing    Myotomes B UE/LE: observed >/= 3/5 throughout and symmetrical    B UE/LE AROM: observed symmetrical and WFL during myotome testing     Cervical AROM:  symmetrical and WFL without symptoms    Outcome Measures:  Other Measures  Dizziness Handicap Inventory: 26     Treatments:    Neuromuscular Re-education: 10 minutes   Attempted gaze stabilization but held due to technique despite cues (moved body vs head or had difficulty keeping target in focus when moving head    Access Code: [email or web address]  URL: [email or web address]  Date: 07/03/2024  Prepared by:     Exercises  - Standing Balance in Corner with Eyes Closed  - 2 x daily - 7 x weekly - 3 sets - 30 sec hold  - Narrow Stance with Counter Support  - 2 x daily - 7 x weekly - 3 sets - 30 sec hold      EDUCATION:  Outpatient Education  Individual(s) Educated: Patient  Education Provided: Anatomy, Fall Risk, Home Exercise Program, Home Safety, Physiology, POC  Risk and Benefits Discussed with Patient/Caregiver/Other: yes  Patient/Caregiver Demonstrated Understanding: yes  Plan of Care Discussed and Agreed Upon: yes  Patient Response to Education: Patient/Caregiver Verbalized Understanding of Information  Role of PT and PT POC.  Educated Elina regarding benefit and purpose of skilled vestibular PT services along with results of examination findings and how this correlates to their chief complaint.   Answered Elina  Del's questions in full.  Reviewed relevant anatomy using model.   Discussed other possible causes of  Dizziness including UVL and rationale for ex.   Cues for safety with balance ex at home and to perform near stable surface or in corner to minimize the risk of falls.     Goals:      STG's (within 2 weeks)    Elina Mathis will report 10% reduction in imbalance with daily activities.    LTG's (by discharge)    Elina Mathis will test negative for positional vertigo.    Elina Mathis will report no complaint of positional imbalance for one week with daily activities.    Elina Mathis will decrease DHI by >/= 18 points (minimally clinically important difference) or </=34 points (16-34 Points is a mild handicap) in order to improve safety at home and decrease falls risk. Baseline 26/100    Elina Mathis will complete all 4 conditions of ModCTSIB for 30 secs with min to no sway to increase safety, decrease fall risk, and improve ability to complete functional activities.    Elina Mathis will complete sit <> stand transfers using BUE, equal weight bearing on LEs, and no major LOB at completion of transfer during 3/3 trials in order to enhance functional mobility and safety.    Elina Mathis will ambulate independently on even surfaces for community distances without imbalance or major deviation to safely return to [facility] and enhance access to the community.    Elina Mathis will demonstrate independence and report compliance with HEP to facilitate independent rehab program upon discharge.       Time Calculation  Start Time: 1615  Stop Time: 1705  Time Calculation (min): 50 min  PT Evaluation Time Entry  PT Evaluation (Low) Time Entry: 40  PT Therapeutic Procedures Time Entry  Neuromuscular Re-Education Time Entry: 10

## 2024-07-17 ENCOUNTER — TREATMENT (OUTPATIENT)
Dept: PHYSICAL THERAPY | Facility: CLINIC | Age: 89
End: 2024-07-17
Payer: COMMERCIAL

## 2024-07-17 DIAGNOSIS — R26.81 UNSTEADINESS: ICD-10-CM

## 2024-07-17 DIAGNOSIS — R26.89 IMBALANCE: ICD-10-CM

## 2024-07-17 DIAGNOSIS — R42 DIZZINESS: Primary | ICD-10-CM

## 2024-07-17 PROCEDURE — 97112 NEUROMUSCULAR REEDUCATION: CPT | Mod: GP

## 2024-07-17 NOTE — PROGRESS NOTES
Physical Therapy    Physical Therapy Treatment    Patient Name: Elina aMthis  MRN: [id number]  Today's Date: 7/17/2024  Time Calculation  Start Time: 1455  Stop Time: 1540  Time Calculation (min): 45 min  Insurance - reviewed   Visit number: 2 (updated 07/17/24)   Payor: [employer] [facility] / Plan: [employer] [place] / Product Type: *No Product type* /    VISITS ARE MED NEC NO AUTH NEEDED PAYS %   Referring Provider: Maegan Brady, APR*     Assessment:  Elina Mathis Completed treatment today that was progressed with gaze stabilization ex without significant changes in symptoms. Elina  requires initial max cues with gaze stabilization ex for proper technique and much time required for proper technique.  Initial improvement with use of metronome, but variable after initial few reps so held. Able to progress balance ex per log without incident and Elina is challenged with current program.      Elina's  response to skilled interventions: Patient tolerated therapeutic interventions well and without any adverse events. Improved independence with home exercises.    Elina's Progress towards goals:  Improved balance.     Justification for skilled care:  Assess effectiveness of HEP. Modify and progress home exercise program.  Elina Mathis continues to have dizziness and balance deficits limiting participation in ADLs, IADLs, and meaningful activities. Further skilled therapy services are warranted to address the remaining impairments in order to progress towards functional goals for the Elina  to fully participate in an active lifestyle. Elina left session with all questions answered and no increase in symptoms.        Plan:  Monitor response to updated HEP.  Progress gaze stabilization as tolerated.     Current Problem  Problem List Items Addressed This Visit    None  Visit Diagnoses         Codes    Dizziness    -  Primary R42    Imbalance     R26.89    Unsteadiness      "R26.81              General  Subjective      Elina Mathis denies any falls or complications since last session. Elina Mathis reports compliance with balance ex and mild improvement.     Elina Mathis reports good compliance with HEP.    Pain:  0/10    Precautions:  Fall Risk: Moderate   Denies: Cancer, Pacemaker, Diabetes, latex allergy, other known cardiac problems, other known neurologic problem, and other known pulmonary problems  Past Surgical history: No pertinent surgical history   Past Medical history: Hypertension, anxiety, dyspnea on exertion, hyperlipidemia, HA, LBP  Wears bifocals, denies any previous ear sx      Objective     Treatments:  Assigned HEP: [employer] Access Code: [email or web address]    Neuromuscular Re-education: 45 minutes   gaze stabilization (30 sec increments)  Seated vertical and horizontal  More challenged with vertical  Initial max cues for technique  Use of metronome to A with technique  Improved technique initially at 80   Standing horizontal and vertical  WBOS on land   Progressed foot position as able to more narrow but still @4-5\" apart  More challenged with vertical    Standing Balance on land with Eyes 3 sets - 30 sec hold   Progressed feet to @ 3\" apart   NBOS - partial tandem on land with EO - 3 sets - 30 sec hold  Tandem stance on land-too challenging with min A to stabilize    OP EDUCATION: Reviewed home exercise program. Home exercise program instructed and issued. Answered questions about home exercise program. Provided verbal feedback to improve exercise technique. rationale for ex, cues for technique, safety with balance ex and to perform near stable surface to minimize the risk of falls.   -Elina  advised symptoms should not increase >3 in intensity with completion of exercises - if so, Elina Mathis has likely have done too much. Advised to decrease duration or # of sets if symptoms INC >3.   -Elina advised symptoms should return to baseline before completing next set.  -Elina advised " that increase in symptoms should only be temporary and symptoms should return completely to the baseline level before continuing onto other exercises or tasks. Elina  advised to hold exercises for the day if it takes more than twenty minutes to resolve. Elina verbalized understanding.    Time Calculation  Start Time: 1455  Stop Time: 1540  Time Calculation (min): 45 min     PT Therapeutic Procedures Time Entry  Neuromuscular Re-Education Time Entry: 45

## 2024-08-08 DIAGNOSIS — Z00.00 HEALTH CARE MAINTENANCE: ICD-10-CM

## 2024-08-08 RX ORDER — PRAVASTATIN SODIUM 10 MG/1
10 TABLET ORAL DAILY
Qty: 90 TABLET | Refills: 1 | Status: SHIPPED | OUTPATIENT
Start: 2024-08-08

## 2024-08-19 ENCOUNTER — APPOINTMENT (OUTPATIENT)
Dept: PHYSICAL THERAPY | Facility: CLINIC | Age: 89
End: 2024-08-19
Payer: COMMERCIAL

## 2024-08-28 ENCOUNTER — APPOINTMENT (OUTPATIENT)
Dept: OTOLARYNGOLOGY | Facility: CLINIC | Age: 89
End: 2024-08-28
Payer: COMMERCIAL

## 2024-09-03 ENCOUNTER — TREATMENT (OUTPATIENT)
Dept: PHYSICAL THERAPY | Facility: CLINIC | Age: 89
End: 2024-09-03
Payer: COMMERCIAL

## 2024-09-03 DIAGNOSIS — R26.89 IMBALANCE: ICD-10-CM

## 2024-09-03 DIAGNOSIS — R42 DIZZINESS: ICD-10-CM

## 2024-09-03 DIAGNOSIS — R26.81 UNSTEADINESS: ICD-10-CM

## 2024-09-03 PROCEDURE — 97112 NEUROMUSCULAR REEDUCATION: CPT | Mod: GP

## 2024-09-03 NOTE — PROGRESS NOTES
Physical Therapy    Physical Therapy Treatment    Patient Name: Elina Mathis  MRN: [id number]  Today's Date: 9/3/2024  Time Calculation  Start Time: 1232  Stop Time: 1315  Time Calculation (min): 43 min  Insurance - reviewed   Visit number: 3 (updated 09/03/24)   Payor: [employer] [facility] / Plan: [employer] [place] / Product Type: *No Product type* /    VISITS ARE MED NEC NO AUTH NEEDED PAYS %   Referring Provider: Maegan Brady, APR*     Assessment:  Elina Mathis Completed treatment today that was progressed with gaze stabilization ex, 1/4 turns and sit to stands without significant changes in symptoms. She did have mild unsteadiness with quick sit to stands that improved after performing HR/TR prior to stand to updated for HEP. Elina  requires min cues with gaze stabilization ex for proper technique and she was able to demonstrate proper technique after review.  Reviewed balance ex per log without incident and progressed as able for Elina to be appropriately challenged with current program.      Elina's  response to skilled interventions: Patient tolerated therapeutic interventions well and without any adverse events. Improved independence with home exercises.    Elina's Progress towards goals:  Improved balance.     Justification for skilled care:  Assess effectiveness of HEP. Modify and progress home exercise program.  Elina Mathis continues to have dizziness and balance deficits limiting participation in ADLs, IADLs, and meaningful activities. Further skilled therapy services are warranted to address the remaining impairments in order to progress towards functional goals for the Elina  to fully participate in an active lifestyle. Elina left session with all questions answered and no increase in symptoms.        Plan:  Monitor response to updated HEP.  Progress gaze stabilization as tolerated.     Current Problem  Problem List Items Addressed This Visit   "  None  Visit Diagnoses         Codes    Dizziness     R42    Imbalance     R26.89    Unsteadiness     R26.81                General  Subjective      Elina Mathis denies any falls or complications since last session. Elina Mathis reports 10% improvement since onset of PT.     Elina Mathis reports good compliance with HEP.    Pain:  8/10, \"arthritis pain\"    Precautions:  Fall Risk: Moderate   Denies: Cancer, Pacemaker, Diabetes, latex allergy, other known cardiac problems, other known neurologic problem, and other known pulmonary problems  Past Surgical history: No pertinent surgical history   Past Medical history: Hypertension, anxiety, dyspnea on exertion, hyperlipidemia, HA, LBP  Wears bifocals, denies any previous ear sx      Objective     Treatments:  Assigned HEP: [employer] Access Code: [email or web address]    Neuromuscular Re-education: 38 minutes   gaze stabilization (30 sec increments)  Standing horizontal and vertical  WBOS on land - progressed to feet 2-3\" apart  Used smaller target  Standing WBOS   Using smaller target  Attempted VOR x1 with convergence but held as too challenging    partial tandem on land with EO - 3 sets - 30 sec hold  Progressed feet to @ 3\" apart  Ankle pumps prior to sit to stands-decreased unsteadiness following  1/4 turns  Cues for technique including head and foot placement    OP EDUCATION: Reviewed home exercise program. Home exercise program instructed and issued. Answered questions about home exercise program. Provided verbal feedback to improve exercise technique. rationale for ex, cues for technique, safety with balance ex and to perform near stable surface to minimize the risk of falls.   -Elina  advised symptoms should not increase >3 in intensity with completion of exercises - if so, Elina Mathis has likely have done too much. Advised to decrease duration or # of sets if symptoms INC >3.   -Elina advised symptoms should return to baseline before completing next set.  -Elina advised that " increase in symptoms should only be temporary and symptoms should return completely to the baseline level before continuing onto other exercises or tasks. Elina  advised to hold exercises for the day if it takes more than twenty minutes to resolve. Elina verbalized understanding.    Time Calculation  Start Time: 1232  Stop Time: 1315  Time Calculation (min): 43 min     PT Therapeutic Procedures Time Entry  Neuromuscular Re-Education Time Entry: 38

## 2024-09-13 ENCOUNTER — OFFICE VISIT (OUTPATIENT)
Dept: PRIMARY CARE | Facility: CLINIC | Age: 89
End: 2024-09-13
Payer: COMMERCIAL

## 2024-09-13 ENCOUNTER — LAB (OUTPATIENT)
Dept: LAB | Facility: LAB | Age: 89
End: 2024-09-13
Payer: MEDICARE

## 2024-09-13 VITALS — SYSTOLIC BLOOD PRESSURE: 132 MMHG | DIASTOLIC BLOOD PRESSURE: 73 MMHG | WEIGHT: 139.5 LBS | BODY MASS INDEX: 23.58 KG/M2

## 2024-09-13 DIAGNOSIS — E78.49 ESSENTIAL FAMILIAL HYPERLIPIDEMIA: ICD-10-CM

## 2024-09-13 DIAGNOSIS — I10 BENIGN ESSENTIAL HTN: Primary | ICD-10-CM

## 2024-09-13 DIAGNOSIS — F41.9 ANXIETY: ICD-10-CM

## 2024-09-13 DIAGNOSIS — H81.10 BENIGN PAROXYSMAL POSITIONAL VERTIGO, UNSPECIFIED LATERALITY: ICD-10-CM

## 2024-09-13 DIAGNOSIS — I10 BENIGN ESSENTIAL HTN: ICD-10-CM

## 2024-09-13 DIAGNOSIS — F51.01 PRIMARY INSOMNIA: ICD-10-CM

## 2024-09-13 LAB
ALBUMIN SERPL BCP-MCNC: 4.1 G/DL (ref 3.4–5)
ALP SERPL-CCNC: 85 U/L (ref 33–136)
ALT SERPL W P-5'-P-CCNC: 12 U/L (ref 7–45)
AST SERPL W P-5'-P-CCNC: 17 U/L (ref 9–39)
BILIRUB DIRECT SERPL-MCNC: 0.1 MG/DL (ref 0–0.3)
BILIRUB SERPL-MCNC: 0.4 MG/DL (ref 0–1.2)
CHOLEST SERPL-MCNC: 220 MG/DL (ref 0–199)
CHOLESTEROL/HDL RATIO: 2.6
HDLC SERPL-MCNC: 83.1 MG/DL
LDLC SERPL CALC-MCNC: 119 MG/DL
NON HDL CHOLESTEROL: 137 MG/DL (ref 0–149)
PROT SERPL-MCNC: 7 G/DL (ref 6.4–8.2)
TRIGL SERPL-MCNC: 92 MG/DL (ref 0–149)
VLDL: 18 MG/DL (ref 0–40)

## 2024-09-13 PROCEDURE — 3078F DIAST BP <80 MM HG: CPT | Performed by: INTERNAL MEDICINE

## 2024-09-13 PROCEDURE — 80061 LIPID PANEL: CPT

## 2024-09-13 PROCEDURE — 80076 HEPATIC FUNCTION PANEL: CPT

## 2024-09-13 PROCEDURE — 99214 OFFICE O/P EST MOD 30 MIN: CPT | Performed by: INTERNAL MEDICINE

## 2024-09-13 PROCEDURE — 3075F SYST BP GE 130 - 139MM HG: CPT | Performed by: INTERNAL MEDICINE

## 2024-09-13 NOTE — PROGRESS NOTES
Subjective   Patient ID: Elina Mathis is a 88 y.o. female who presents for No chief complaint on file..    HPI follow-up visit no chest pain no shortness of breath no side effect with medication has been going to balance therapy for her vertigo she thinks she is some better is held out on additional medicines in that regard discussed with dietary and motion concerns risk-benefit side effect of her medication reviewed with her and wishes to proceed usually takes between 1 and 2 diazepam per week for anxiety and insomnia bowels have been okay no dysuria    Review of Systems    Objective   There were no vitals taken for this visit.    Physical Exam  Vital signs noted alert and oriented x 3 NCAT no JVD or bruit chest clear to auscultation and percussion CV regular rate rhythm S1-S2 no murmur gallop or rub extremities no tremor no clubbing cyanosis or edema normal distal pulses DTR 2+ PERRLA EOMI no nystagmus nares without discharge OP benign TM EAC clear  Assessment/Plan    impression vertigo hypertension hyperlipidemia insomnia anxiety  Plan watch salt in diet slow positional changes continue with physical therapy for balance check hepatic panel lipid panel advised on cholesterol cholesterol medicine review overall weight and diet and exercise and if additional weight loss in the future may readvised on medication continue with blood pressure and cholesterol management sleep hygiene refill diazepam when needed medication and may need to urine medication screen by next visit check OARRS report then follow-up based on above  Tt40 cc21

## 2024-09-27 DIAGNOSIS — I10 BENIGN ESSENTIAL HTN: ICD-10-CM

## 2024-09-27 RX ORDER — LISINOPRIL 10 MG/1
10 TABLET ORAL DAILY
Qty: 90 TABLET | Refills: 1 | Status: SHIPPED | OUTPATIENT
Start: 2024-09-27

## 2024-10-07 ENCOUNTER — APPOINTMENT (OUTPATIENT)
Dept: PHYSICAL THERAPY | Facility: CLINIC | Age: 89
End: 2024-10-07
Payer: COMMERCIAL

## 2024-10-28 ENCOUNTER — APPOINTMENT (OUTPATIENT)
Dept: PHYSICAL THERAPY | Facility: CLINIC | Age: 89
End: 2024-10-28
Payer: COMMERCIAL

## 2025-01-08 DIAGNOSIS — R09.81 NASAL CONGESTION: ICD-10-CM

## 2025-01-08 DIAGNOSIS — R44.8 FACIAL PRESSURE: ICD-10-CM

## 2025-01-08 RX ORDER — FLUTICASONE PROPIONATE 50 MCG
SPRAY, SUSPENSION (ML) NASAL
Qty: 16 G | Refills: 1 | Status: SHIPPED | OUTPATIENT
Start: 2025-01-08

## 2025-01-14 ENCOUNTER — APPOINTMENT (OUTPATIENT)
Dept: PRIMARY CARE | Facility: CLINIC | Age: 89
End: 2025-01-14
Payer: MEDICARE

## 2025-01-14 ENCOUNTER — LAB (OUTPATIENT)
Dept: LAB | Facility: LAB | Age: 89
End: 2025-01-14
Payer: MEDICARE

## 2025-01-14 VITALS — DIASTOLIC BLOOD PRESSURE: 77 MMHG | SYSTOLIC BLOOD PRESSURE: 127 MMHG

## 2025-01-14 DIAGNOSIS — F41.9 ANXIETY: ICD-10-CM

## 2025-01-14 DIAGNOSIS — I10 BENIGN ESSENTIAL HTN: ICD-10-CM

## 2025-01-14 DIAGNOSIS — Z79.899 MEDICATION MANAGEMENT: ICD-10-CM

## 2025-01-14 DIAGNOSIS — Z79.899 MEDICATION MANAGEMENT: Primary | ICD-10-CM

## 2025-01-14 PROCEDURE — 3078F DIAST BP <80 MM HG: CPT | Performed by: INTERNAL MEDICINE

## 2025-01-14 PROCEDURE — 80307 DRUG TEST PRSMV CHEM ANLYZR: CPT

## 2025-01-14 PROCEDURE — 3074F SYST BP LT 130 MM HG: CPT | Performed by: INTERNAL MEDICINE

## 2025-01-14 PROCEDURE — 99213 OFFICE O/P EST LOW 20 MIN: CPT | Performed by: INTERNAL MEDICINE

## 2025-01-14 PROCEDURE — 80346 BENZODIAZEPINES1-12: CPT

## 2025-01-14 NOTE — PROGRESS NOTES
Subjective   Patient ID: Elina Mathis is a 89 y.o. female who presents for No chief complaint on file..    HPI follow-up visit no chest pain no shortness of breath has had none vertigo head spinning had gone to [facility] type therapy did not help much but did not stick with it may go back to the neurologist also with some right leg discomfort centered around her knee there was no injury there no chest pain no shortness of breath no palpitation no falls bowels normal no dysuria    Review of Systems    Objective   There were no vitals taken for this visit.    Physical Exam vital signs noted alert and oriented x 3 NCAT no JVD no lid lag no thyromegaly chest clear to auscultation CV regular rate rhythm S1-S2 without murmur gallop or rub extremities no clubbing cyanosis or edema normal distal pulses DTR 2+ and brisk musculoskeletal right hip full range of motion right knee full range of motion without crepitus no swelling right ankle full range of motion normal gait able to arise from chair    Assessment/Plan impression anxiety hypertension PPB?  Knee pain right side balance other diagnoses  Plan okay for urine medication screening test okay for medication agreement check OARRS report prior to next visit and prescription risk-benefit side effect reviewed with her and wishes to proceed no blood work was desired at this time may follow-up for mammogram but still having no longer having colon screening leg exercises look like to light weights for instance 2 pounds Velcro on may be of use may go back to the neurologist for the dizziness or balance Endor may go to your nose and throat for same or similar exercises good water consumption Voltaren gel for the knee nightly melatonin if needed for the insomnia sleep hygiene and recheck 3 months based on above   LOV?  See prior

## 2025-01-15 LAB
AMPHETAMINES UR QL SCN: ABNORMAL
BARBITURATES UR QL SCN: ABNORMAL
BENZODIAZ UR QL SCN: ABNORMAL
BZE UR QL SCN: ABNORMAL
CANNABINOIDS UR QL SCN: ABNORMAL
FENTANYL+NORFENTANYL UR QL SCN: ABNORMAL
METHADONE UR QL SCN: ABNORMAL
OPIATES UR QL SCN: ABNORMAL
OXYCODONE+OXYMORPHONE UR QL SCN: ABNORMAL
PCP UR QL SCN: ABNORMAL

## 2025-01-17 LAB
1OH-MIDAZOLAM UR CFM-MCNC: <25 NG/ML
7AMINOCLONAZEPAM UR CFM-MCNC: <25 NG/ML
A-OH ALPRAZ UR CFM-MCNC: <25 NG/ML
ALPRAZ UR CFM-MCNC: <25 NG/ML
CHLORDIAZEP UR CFM-MCNC: <25 NG/ML
CLONAZEPAM UR CFM-MCNC: <25 NG/ML
DIAZEPAM UR CFM-MCNC: <25 NG/ML
LORAZEPAM UR CFM-MCNC: <25 NG/ML
MIDAZOLAM UR CFM-MCNC: <25 NG/ML
NORDIAZEPAM UR CFM-MCNC: 49 NG/ML
OXAZEPAM UR CFM-MCNC: 161 NG/ML
TEMAZEPAM UR CFM-MCNC: 169 NG/ML

## 2025-01-28 DIAGNOSIS — I10 BENIGN ESSENTIAL HTN: ICD-10-CM

## 2025-01-28 DIAGNOSIS — F41.9 ANXIETY: ICD-10-CM

## 2025-02-01 DIAGNOSIS — F41.9 ANXIETY: ICD-10-CM

## 2025-02-01 RX ORDER — DIAZEPAM 5 MG/1
TABLET ORAL
Qty: 30 TABLET | Refills: 0 | Status: SHIPPED | OUTPATIENT
Start: 2025-02-01 | End: 2025-02-06 | Stop reason: SDUPTHER

## 2025-02-01 RX ORDER — DIAZEPAM 5 MG/1
TABLET ORAL
Qty: 30 TABLET | Refills: 0 | OUTPATIENT
Start: 2025-02-01

## 2025-02-06 DIAGNOSIS — I10 BENIGN ESSENTIAL HYPERTENSION: ICD-10-CM

## 2025-02-06 DIAGNOSIS — Z00.00 HEALTH CARE MAINTENANCE: ICD-10-CM

## 2025-02-06 DIAGNOSIS — F41.9 ANXIETY: ICD-10-CM

## 2025-02-08 RX ORDER — DIAZEPAM 5 MG/1
TABLET ORAL
Qty: 30 TABLET | Refills: 0 | Status: SHIPPED | OUTPATIENT
Start: 2025-02-08

## 2025-02-11 DIAGNOSIS — I10 BENIGN ESSENTIAL HTN: ICD-10-CM

## 2025-02-13 RX ORDER — PRAVASTATIN SODIUM 10 MG/1
10 TABLET ORAL DAILY
Qty: 90 TABLET | Refills: 1 | Status: SHIPPED | OUTPATIENT
Start: 2025-02-13

## 2025-02-13 RX ORDER — LISINOPRIL 10 MG/1
10 TABLET ORAL DAILY
Qty: 90 TABLET | Refills: 1 | Status: SHIPPED | OUTPATIENT
Start: 2025-02-13

## 2025-02-13 RX ORDER — HYDROCHLOROTHIAZIDE 25 MG/1
25 TABLET ORAL DAILY
Qty: 90 TABLET | Refills: 1 | Status: SHIPPED | OUTPATIENT
Start: 2025-02-13

## 2025-02-24 ENCOUNTER — APPOINTMENT (OUTPATIENT)
Dept: PRIMARY CARE | Facility: CLINIC | Age: 89
End: 2025-02-24
Payer: COMMERCIAL

## 2025-02-24 VITALS — WEIGHT: 138 LBS | BODY MASS INDEX: 23.32 KG/M2 | SYSTOLIC BLOOD PRESSURE: 141 MMHG | DIASTOLIC BLOOD PRESSURE: 81 MMHG

## 2025-02-24 DIAGNOSIS — M17.11 PRIMARY OSTEOARTHRITIS OF RIGHT KNEE: ICD-10-CM

## 2025-02-24 DIAGNOSIS — R63.4 WEIGHT LOSS: Primary | ICD-10-CM

## 2025-02-24 DIAGNOSIS — R79.9 ABNORMAL FINDING OF BLOOD CHEMISTRY, UNSPECIFIED: ICD-10-CM

## 2025-02-24 PROCEDURE — 99213 OFFICE O/P EST LOW 20 MIN: CPT | Performed by: INTERNAL MEDICINE

## 2025-02-24 PROCEDURE — G2211 COMPLEX E/M VISIT ADD ON: HCPCS | Performed by: INTERNAL MEDICINE

## 2025-02-24 PROCEDURE — 3077F SYST BP >= 140 MM HG: CPT | Performed by: INTERNAL MEDICINE

## 2025-02-24 PROCEDURE — 3079F DIAST BP 80-89 MM HG: CPT | Performed by: INTERNAL MEDICINE

## 2025-02-24 NOTE — PROGRESS NOTES
Subjective   Patient ID: Elina Mathis is a 89 y.o. female who presents for No chief complaint on file..    HPI follow-up visit and sick visit has been limping on her right knee more recently no falls or new injury thinks she has lost some weight although appears to be about the same she had gone to [facility] for over another opinion about her leg but other than physical therapy recommendations no other intervention no new medication bowels normal no dysuria    Review of Systems    Objective   /81   Wt 62.6 kg (138 lb)   BMI 23.32 kg/m²     Physical Exam vital signs noted alert and oriented x 3 NCAT no icterus no jaundice no pallor no JVD or bruit chest clear to auscultation CV regular rate and rhythm S1-S2 extremities no clubbing cyanosis or edema normal distal pulses abdomen soft nontender normal active bowel sounds extremities no clubbing cyanosis or edema normal distal pulses    Assessment/Plan impression burning?  Weight loss other diagnoses djd knee  Plan review of prior laboratory results and x-ray films recently check hepatic panel advised on liver enzymes check ESR advised on inflammation check A1c advised on long-term blood sugar test okay to refer to orthopedic surgery for the right knee appears to be limping but has good strength there does not appear to be an effusion present there is good range of motion wearing normal shoewear as for the weight loss appears to be about the same but will review over longer portion of time and the blood work and then follow-up based on above

## 2025-02-25 LAB
ALBUMIN SERPL-MCNC: 4.3 G/DL (ref 3.6–5.1)
ALBUMIN/GLOB SERPL: 1.4 (CALC) (ref 1–2.5)
ALP SERPL-CCNC: 74 U/L (ref 37–153)
ALT SERPL-CCNC: 12 U/L (ref 6–29)
AST SERPL-CCNC: 17 U/L (ref 10–35)
BILIRUB DIRECT SERPL-MCNC: 0.1 MG/DL
BILIRUB INDIRECT SERPL-MCNC: 0.3 MG/DL (CALC) (ref 0.2–1.2)
BILIRUB SERPL-MCNC: 0.4 MG/DL (ref 0.2–1.2)
ERYTHROCYTE [SEDIMENTATION RATE] IN BLOOD BY WESTERGREN METHOD: 2 MM/H
EST. AVERAGE GLUCOSE BLD GHB EST-MCNC: 126 MG/DL
EST. AVERAGE GLUCOSE BLD GHB EST-SCNC: 7 MMOL/L
GLOBULIN SER CALC-MCNC: 3 G/DL (CALC) (ref 1.9–3.7)
HBA1C MFR BLD: 6 % OF TOTAL HGB
PROT SERPL-MCNC: 7.3 G/DL (ref 6.1–8.1)

## 2025-03-07 ENCOUNTER — TELEPHONE (OUTPATIENT)
Dept: PRIMARY CARE | Facility: CLINIC | Age: 89
End: 2025-03-07
Payer: COMMERCIAL

## 2025-03-18 ENCOUNTER — APPOINTMENT (OUTPATIENT)
Dept: PRIMARY CARE | Facility: CLINIC | Age: 89
End: 2025-03-18
Payer: COMMERCIAL

## 2025-03-18 VITALS — BODY MASS INDEX: 23.15 KG/M2 | SYSTOLIC BLOOD PRESSURE: 123 MMHG | WEIGHT: 137 LBS | DIASTOLIC BLOOD PRESSURE: 74 MMHG

## 2025-03-18 DIAGNOSIS — I10 BENIGN ESSENTIAL HTN: Primary | ICD-10-CM

## 2025-03-18 DIAGNOSIS — R73.02 GLUCOSE INTOLERANCE (IMPAIRED GLUCOSE TOLERANCE): ICD-10-CM

## 2025-03-18 PROCEDURE — 3078F DIAST BP <80 MM HG: CPT | Performed by: INTERNAL MEDICINE

## 2025-03-18 PROCEDURE — 3074F SYST BP LT 130 MM HG: CPT | Performed by: INTERNAL MEDICINE

## 2025-03-18 PROCEDURE — 99213 OFFICE O/P EST LOW 20 MIN: CPT | Performed by: INTERNAL MEDICINE

## 2025-03-18 NOTE — PROGRESS NOTES
Subjective   Patient ID: Elina Mathis is a 89 y.o. female who presents for No chief complaint on file..    HPI   Follow-up visit and she was concerned about her weight she was concerned about her legs had some testing that was done including films vascular studies and blood work these were all reviewed with her no chest pain no shortness of breath other than the same chest tightness she has had for many decades bowels normal no dysuria no polyuria polydipsia vital signs noted alert and oriented x 3 NCAT no JVD chest clear to auscultation CV regular rate and rhythm S1-S2 without murmur gallop or rub abdomen soft nontender normal active bowel sounds extremities no clubbing cyanosis or edema normal distal pulses DTR 2+  Review of Systems    Objective   There were no vitals taken for this visit.    Physical Exam    Assessment/Plan        Impression weight loss?  Hypertension glucose intolerance other diagnoses  Plan okay for a varied diet good amount of calories good water consumption regular exercise continue with same medications will follow-up with vascular medicine regarding her TIM results which were reviewed with her chest x-ray was clear glycohemoglobin was 6.0 the rest of the blood work was adequate then will recheck for regular visit End[facility] if no better

## 2025-03-19 ENCOUNTER — HOSPITAL ENCOUNTER (OUTPATIENT)
Dept: RADIOLOGY | Facility: CLINIC | Age: 89
Discharge: HOME | End: 2025-03-19
Payer: COMMERCIAL

## 2025-03-19 ENCOUNTER — APPOINTMENT (OUTPATIENT)
Dept: ORTHOPEDIC SURGERY | Facility: CLINIC | Age: 89
End: 2025-03-19
Payer: COMMERCIAL

## 2025-03-19 DIAGNOSIS — G89.29 CHRONIC PAIN OF RIGHT KNEE: ICD-10-CM

## 2025-03-19 DIAGNOSIS — M25.561 CHRONIC PAIN OF RIGHT KNEE: Primary | ICD-10-CM

## 2025-03-19 DIAGNOSIS — R26.89 SHUFFLING GAIT: ICD-10-CM

## 2025-03-19 DIAGNOSIS — G89.29 CHRONIC PAIN OF RIGHT KNEE: Primary | ICD-10-CM

## 2025-03-19 DIAGNOSIS — M17.11 PRIMARY OSTEOARTHRITIS OF RIGHT KNEE: ICD-10-CM

## 2025-03-19 DIAGNOSIS — M25.561 CHRONIC PAIN OF RIGHT KNEE: ICD-10-CM

## 2025-03-19 PROCEDURE — 99204 OFFICE O/P NEW MOD 45 MIN: CPT | Performed by: STUDENT IN AN ORGANIZED HEALTH CARE EDUCATION/TRAINING PROGRAM

## 2025-03-19 PROCEDURE — 1159F MED LIST DOCD IN RCRD: CPT | Performed by: STUDENT IN AN ORGANIZED HEALTH CARE EDUCATION/TRAINING PROGRAM

## 2025-03-19 PROCEDURE — 73564 X-RAY EXAM KNEE 4 OR MORE: CPT | Mod: 50

## 2025-03-30 NOTE — PROGRESS NOTES
Department of Orthopaedic Surgery  Division of Adult Reconstruction    Chief Complaint: Right knee pain    HPI:  Elina Mathis is a pleasant 89 y.o. year-old female who is seen today for evaluation of right knee pain.  Patient complains of longstanding right knee pain for the past few years.  It is worse recently.  She has difficulty traversing stairs and uneven surfaces.  She has tried rest, activity modification, over-the-counter medication and previous steroid injection with some relief.  She is most concerned with the implications for her gait, and she exhibits a slow shuffling gait bilaterally.  She and her family members do not discern any cognitive changes.  She denies overt radicular symptoms.    Review of Symptoms:  The patient denies any fever, chills, chest pain, shortness of breath or difficulty breathing.  Patient denies any numbness, tingling, or radicular symptoms.      Adult patient history sheet was filled out by the patient today in clinic and will be scanned into the EMR.  I personally reviewed this form which will be scanned into the EMR.  This includes Past Medical History, Past Surgical History, Medications, Allergies, Social History, Family History and 12 point review of systems.    Past Medical History:    Past Medical History:   Diagnosis Date    Dizziness     Personal history of other diseases of the circulatory system     History of hypertension    Personal history of other diseases of the digestive system     History of esophageal reflux    Personal history of other endocrine, nutritional and metabolic disease     History of high cholesterol       Past Surgical History:    No past surgical history on file.    Allergies:    No Known Allergies    Medications:    Current Outpatient Medications on File Prior to Visit   Medication Sig Dispense Refill    diazePAM (Valium) 5 mg tablet One po prn anxiety 30 tablet 0    dilTIAZem CD (Cardizem CD) 120 mg 24 hr capsule Take 1 capsule (120 mg) by mouth  once daily. 30 capsule 0    fluticasone (Flonase) 50 mcg/actuation nasal spray Administer 2 sprays into each nostril, once daily. 16 g 1    hydroCHLOROthiazide (HYDRODiuril) 25 mg tablet Take 1 tablet (25 mg) by mouth once daily. 90 tablet 1    ibuprofen 600 mg tablet Take 1 tablet (600 mg) by mouth once daily. Take with food as needed      lisinopril 10 mg tablet Take 1 tablet (10 mg) by mouth once daily. 90 tablet 1    nortriptyline (Pamelor) 10 mg capsule Take 1 capsule (10 mg) by mouth once daily at bedtime. 30 capsule 2    omeprazole (PriLOSEC) 40 mg DR capsule Take 1 capsule (40 mg) by mouth 2 times a day.      pravastatin (Pravachol) 10 mg tablet Take 1 tablet (10 mg) by mouth once daily. 90 tablet 1     No current facility-administered medications on file prior to visit.     Social History:    Social History     Occupational History    Not on file   Tobacco Use    Smoking status: Never    Smokeless tobacco: Never   Substance and Sexual Activity    Alcohol use: Yes     Alcohol/week: 7.0 standard drinks of alcohol     Types: 7 Glasses of wine per week    Drug use: Never    Sexual activity: Not on file       Family History:    Family History   Problem Relation Name Age of Onset    Depression Sister         Exam:  General: Well-appearing female in no acute distress.  Awake, alert and oriented.  Pleasant and cooperative.  Respiratory: Non-labored breathing  Mood: Euthymic   Gait: Slow, shuffling  Assistive Device: None     Affected Right Knee  Limb Alignment: Valgus  ROM: 0-110  Stable to varus and valgus stress at full extension and 30 degrees of flexion  Skin: Intact, no abrasions or draining sinuses  Effusion: None  Quad Strength: 5/5  Hamstring Strength: 5/5  Patella Crepitus: None  Patella Grind: Positive  Tenderness: Lateral joint line  Sensation: Intact to light touch distally  Motor function: Able to fire TA, EHL, G/S  Pulses: Palpable DP pulse    Imaging interpretation:   X-rays of the right knee  demonstrate osteoarthritis with valgus alignment.  Joint space narrowing, osteophyte formation, and subchondral sclerosis.    Assessment and Plan:  89-year-old female with right knee pain secondary to osteoarthritis, currently undergoing nonoperative treatment.  She is less concerned with the pain in her knee and more concerned with the implications of her gait.  Observing her gait, she does not quite exhibit the typical antalgic pattern associated with knee pain, and it is more of a reciprocal slow and shuffling gait, symmetric bilaterally.  We discussed possible treatment with surgery, which would entail total right knee arthroplasty.  We would have to discuss further, given her age and symptoms, whether this would be something she would want to pursue.  However, I would like to have her evaluated by a neurologist first in case there is an underlying neurological etiology for her gait.  Referral was placed to neurology.    Tano Higgins MD  Department of Orthopaedic Surgery  Division of Adult Reconstruction  , [facility]

## 2025-04-21 DIAGNOSIS — I10 BENIGN ESSENTIAL HTN: ICD-10-CM

## 2025-04-21 RX ORDER — LISINOPRIL 10 MG/1
10 TABLET ORAL DAILY
Qty: 90 TABLET | Refills: 1 | Status: SHIPPED | OUTPATIENT
Start: 2025-04-21

## 2025-05-12 DIAGNOSIS — Z00.00 HEALTH CARE MAINTENANCE: ICD-10-CM

## 2025-05-12 RX ORDER — PRAVASTATIN SODIUM 10 MG/1
10 TABLET ORAL DAILY
Qty: 90 TABLET | Refills: 1 | Status: SHIPPED | OUTPATIENT
Start: 2025-05-12

## 2025-05-19 DIAGNOSIS — I10 BENIGN ESSENTIAL HYPERTENSION: ICD-10-CM

## 2025-05-20 ENCOUNTER — APPOINTMENT (OUTPATIENT)
Dept: PRIMARY CARE | Facility: CLINIC | Age: 89
End: 2025-05-20
Payer: COMMERCIAL

## 2025-05-20 VITALS — DIASTOLIC BLOOD PRESSURE: 78 MMHG | WEIGHT: 142 LBS | SYSTOLIC BLOOD PRESSURE: 132 MMHG | BODY MASS INDEX: 24 KG/M2

## 2025-05-20 DIAGNOSIS — F41.9 ANXIETY: ICD-10-CM

## 2025-05-20 DIAGNOSIS — R73.02 GLUCOSE INTOLERANCE (IMPAIRED GLUCOSE TOLERANCE): ICD-10-CM

## 2025-05-20 DIAGNOSIS — E78.49 ESSENTIAL FAMILIAL HYPERLIPIDEMIA: Primary | ICD-10-CM

## 2025-05-20 DIAGNOSIS — F51.01 PRIMARY INSOMNIA: ICD-10-CM

## 2025-05-20 DIAGNOSIS — Z00.00 HEALTH CARE MAINTENANCE: ICD-10-CM

## 2025-05-20 DIAGNOSIS — I10 BENIGN ESSENTIAL HTN: ICD-10-CM

## 2025-05-20 PROCEDURE — 1036F TOBACCO NON-USER: CPT | Performed by: INTERNAL MEDICINE

## 2025-05-20 PROCEDURE — G0439 PPPS, SUBSEQ VISIT: HCPCS | Performed by: INTERNAL MEDICINE

## 2025-05-20 PROCEDURE — 99397 PER PM REEVAL EST PAT 65+ YR: CPT | Performed by: INTERNAL MEDICINE

## 2025-05-20 PROCEDURE — 3075F SYST BP GE 130 - 139MM HG: CPT | Performed by: INTERNAL MEDICINE

## 2025-05-20 PROCEDURE — 99214 OFFICE O/P EST MOD 30 MIN: CPT | Performed by: INTERNAL MEDICINE

## 2025-05-20 PROCEDURE — 3078F DIAST BP <80 MM HG: CPT | Performed by: INTERNAL MEDICINE

## 2025-05-20 PROCEDURE — 1160F RVW MEDS BY RX/DR IN RCRD: CPT | Performed by: INTERNAL MEDICINE

## 2025-05-20 PROCEDURE — 1170F FXNL STATUS ASSESSED: CPT | Performed by: INTERNAL MEDICINE

## 2025-05-20 PROCEDURE — 93000 ELECTROCARDIOGRAM COMPLETE: CPT | Performed by: INTERNAL MEDICINE

## 2025-05-20 PROCEDURE — 1159F MED LIST DOCD IN RCRD: CPT | Performed by: INTERNAL MEDICINE

## 2025-05-20 RX ORDER — MIRTAZAPINE 7.5 MG/1
7.5 TABLET, FILM COATED ORAL NIGHTLY
Qty: 30 TABLET | Refills: 2 | Status: SHIPPED | OUTPATIENT
Start: 2025-05-20 | End: 2025-08-18

## 2025-05-20 RX ORDER — DIAZEPAM 5 MG/1
TABLET ORAL
Qty: 30 TABLET | Refills: 0 | Status: SHIPPED | OUTPATIENT
Start: 2025-05-20

## 2025-05-20 NOTE — PROGRESS NOTES
Subjective   Patient ID: Elina Mathis is a 89 y.o. female who presents for No chief complaint on file..    HPI CPE septated Franchi no chest pain or shortness of breath no side effect with medication complains of some nighttime insomnia welling up of anxiety and some reflux type symptomatology does not matter what she has eaten or not eaten at that time bowels normal no dysuria    Past medical history noted and unchanged    Medication noted and unchanged    Allergies no known drug allergies    Social history no tobacco    Family history noted and unchanged    Prevention has been working on her joints with exercising including using a peddler device some prior blood work reviewed no longer having mammogram    Depression screen not depressed but anxious    Review of Systems    Objective   There were no vitals taken for this visit.    Physical Exam  Vital signs noted alert and oriented x 3 NCAT no coryza PERRLA EOMI nares without discharge OP benign TM normal bilateral EAC clear bilateral no AC nodes no JVD no thyromegaly chest clear to auscultation CV regular rate and rhythm S1-S2 without murmur gallop or rub abdomen soft nontender normal active bowel sounds LS spine normal curvature negative straight leg raise extremities no clubbing cyanosis or edema normal distal pulses DTR 2+  Assessment/Plan    impression General Medical examination anxiety insomnia glucose intolerance hypertension other diagnoses hyperlipidemia  Plan check EKG advised on heart (check)  Check lipid panel advised on cholesterol profile and medication check A1c advised on long-term blood sugar test Good diet regular exercise good water consumption consider Remeron for nightly use no longer having mammogram okay for follow-up on day heart and stomach based on above findings then advised on blood sugar then refill diazepam when needed risk benefit side effect reviewed and ok to proceed (UDS CSA OARRS)  then recheck based on above  tt50 [id number]

## 2025-05-21 LAB
CHOLEST SERPL-MCNC: 193 MG/DL
CHOLEST/HDLC SERPL: 2.6 (CALC)
EST. AVERAGE GLUCOSE BLD GHB EST-MCNC: 126 MG/DL
EST. AVERAGE GLUCOSE BLD GHB EST-SCNC: 7 MMOL/L
HBA1C MFR BLD: 6 %
HDLC SERPL-MCNC: 73 MG/DL
LDLC SERPL CALC-MCNC: 101 MG/DL (CALC)
NONHDLC SERPL-MCNC: 120 MG/DL (CALC)
TRIGL SERPL-MCNC: 101 MG/DL

## 2025-05-22 RX ORDER — HYDROCHLOROTHIAZIDE 25 MG/1
25 TABLET ORAL DAILY
Qty: 90 TABLET | Refills: 1 | Status: SHIPPED | OUTPATIENT
Start: 2025-05-22

## 2025-06-02 ASSESSMENT — ACTIVITIES OF DAILY LIVING (ADL)
BATHING: INDEPENDENT
DRESSING: INDEPENDENT
DOING_HOUSEWORK: INDEPENDENT
MANAGING_FINANCES: INDEPENDENT
TAKING_MEDICATION: INDEPENDENT
GROCERY_SHOPPING: INDEPENDENT

## 2025-06-02 ASSESSMENT — ENCOUNTER SYMPTOMS
LOSS OF SENSATION IN FEET: 0
DEPRESSION: 0
OCCASIONAL FEELINGS OF UNSTEADINESS: 0

## 2025-06-24 ENCOUNTER — APPOINTMENT (OUTPATIENT)
Dept: LAB | Facility: HOSPITAL | Age: 89
End: 2025-06-24
Payer: COMMERCIAL

## 2025-06-24 ENCOUNTER — APPOINTMENT (OUTPATIENT)
Dept: NEUROLOGY | Facility: CLINIC | Age: 89
End: 2025-06-24
Payer: COMMERCIAL

## 2025-06-24 VITALS
SYSTOLIC BLOOD PRESSURE: 144 MMHG | WEIGHT: 140 LBS | BODY MASS INDEX: 23.66 KG/M2 | DIASTOLIC BLOOD PRESSURE: 82 MMHG | RESPIRATION RATE: 18 BRPM | HEART RATE: 72 BPM

## 2025-06-24 DIAGNOSIS — R26.89 SHUFFLING GAIT: ICD-10-CM

## 2025-06-24 DIAGNOSIS — G60.3 IDIOPATHIC PROGRESSIVE POLYNEUROPATHY: Primary | ICD-10-CM

## 2025-06-24 LAB — PROT SERPL-MCNC: 7.1 G/DL (ref 6.4–8.2)

## 2025-06-24 PROCEDURE — 1159F MED LIST DOCD IN RCRD: CPT | Performed by: PSYCHIATRY & NEUROLOGY

## 2025-06-24 PROCEDURE — 3079F DIAST BP 80-89 MM HG: CPT | Performed by: PSYCHIATRY & NEUROLOGY

## 2025-06-24 PROCEDURE — 84155 ASSAY OF PROTEIN SERUM: CPT

## 2025-06-24 PROCEDURE — 84165 PROTEIN E-PHORESIS SERUM: CPT

## 2025-06-24 PROCEDURE — 99204 OFFICE O/P NEW MOD 45 MIN: CPT | Performed by: PSYCHIATRY & NEUROLOGY

## 2025-06-24 PROCEDURE — 3077F SYST BP >= 140 MM HG: CPT | Performed by: PSYCHIATRY & NEUROLOGY

## 2025-06-24 PROCEDURE — 1036F TOBACCO NON-USER: CPT | Performed by: PSYCHIATRY & NEUROLOGY

## 2025-06-24 PROCEDURE — 1160F RVW MEDS BY RX/DR IN RCRD: CPT | Performed by: PSYCHIATRY & NEUROLOGY

## 2025-06-24 ASSESSMENT — UNIFIED PARKINSONS DISEASE RATING SCALE (UPDRS)
AMPLITUDE_LIP_JAW: 0
LEVODOPA: NO
POSTURE: 1
KINETIC_TREMOR_LEFTHAND: 1
SPONTANEITY_OF_MOVEMENT: 0
LEG_AGILITY_LEFT: 1
POSTURAL_STABILITY: 2
AMPLITUDE_LLE: 0
SPEECH: 1
PRONATION_SUPINATION_LEFT: 0
KINETIC_TREMOR_RIGHTHAND: 1
POSTURAL_TREMOR_RIGHTHAND: 0
CONSTANCY_TREMOR_ATREST: 0
HANDMOVEMENTS_RIGHT: 0
PRONATION_SUPINATION_RIGHT: 0
AMPLITUDE_LUE: 0
AMPLITUDE_RUE: 0
CHAIR_RISING_SCALE: 1
FINGER_TAPPING_LEFT: 1
TOETAPPING_LEFT: 1
PARKINSONS_MEDS: NO
FINGER_TAPPING_RIGHT: 1
FREEZING_GAIT: 2
POSTURAL_TREMOR_LEFTHAND: 0
LEG_AGILITY_RIGHT: 0
DYSKINESIAS_PRESENT: NO
AMPLITUDE_RLE: 0
TOETAPPING_RIGHT: 0
GAIT: 2

## 2025-06-24 ASSESSMENT — ENCOUNTER SYMPTOMS
DEPRESSION: 0
LOSS OF SENSATION IN FEET: 0
OCCASIONAL FEELINGS OF UNSTEADINESS: 1

## 2025-06-24 NOTE — LETTER
"June 24, 2025     Tano Higgins MD  [address]   [address]  [facility] [address]    Patient: Elina Mathis   YOB: 1935   Date of Visit: 6/24/2025       Dear Dr. Tano Higgins MD:    Thank you for referring Elina Mathis to me for evaluation. Below are my notes for this consultation.  If you have questions, please do not hesitate to call me. I look forward to following your patient along with you.       Sincerely,     Giuseppe Arriola MD      CC: Jalen Etienne MD  ______________________________________________________________________________________    [facility]     Subjective     Elina Mathis is a right handed  89 y.o. year old female who presents with Gait Problem.   Visit type: new patient visit     HPI    Patient Health Questionnaire-2 Score: 0      Previously chest tightness and tiredness that caused trouble walking.  Onset 4-5 months ago of leg heavy feeling.  Change in gait, more unsteady.  No cane, no falls.    Maybe she shuffles a bit.  No hypophonia or micrographia.  No numbness.  She is weak all over.  She has bad \"arthritis in my back\".  No bowel or bladder changes.      NON MOTOR SYMPTOMS  Orthostatic lightheadedness: while standing perhaps  Depression: no  Anxiety: no  REM sleepp behavioral disorder: no  Other sleep disorders: no    Retired .       Problem List[1]   Medical History[2] nothing  Surgical History[3] no  Social History     Socioeconomic History   • Marital status: Single     Spouse name: Not on file   • Number of children: Not on file   • Years of education: Not on file   • Highest education level: Not on file   Occupational History   • Not on file   Tobacco Use   • Smoking status: Never   • Smokeless tobacco: Never   Substance and Sexual Activity   • Alcohol use: Yes     Alcohol/week: 7.0 standard drinks of alcohol     Types: 7 Glasses of wine per week   • Drug use: Never   • Sexual activity: Not on file   Other Topics Concern   • " Not on file   Social History Narrative   • Not on file     Social Drivers of Health     Financial Resource Strain: Not on file   Food Insecurity: No Food Insecurity (6/6/2024)    Hunger Vital Sign    • Worried About Running Out of Food in the Last Year: Never true    • Ran Out of Food in the Last Year: Never true   Transportation Needs: Not on file   Physical Activity: Not on file   Stress: Not on file   Social Connections: Not on file   Intimate Partner Violence: Not on file   Housing Stability: Not on file      Family History[4]  M-Alzheimer's disease  Patient Health Questionnaire-2 Score: 0          Review of Systems  All other system have been reviewed and are negative for complaint.  Objective     Vitals:    06/24/25 1532   BP: 144/82   BP Location: Left arm   Patient Position: Sitting   BP Cuff Size: Adult   Pulse: 72   Resp: 18   Weight: 63.5 kg (140 lb)        Neurological Exam      GENERAL APPEARANCE:  No distress, alert and cooperative.     CARDIOVASCULAR: Radial pulses +2 and equal.      MENTAL STATE:  Orientation was normal to time, place and person. Recent and remote memory was intact.  She remembered 2 out of 3 objects.  Attention span and concentration were normal. Language testing was intact. Calculation was intact.  She could do 4 out of 5 serial 7 calculations.    OPHTHALMOSCOPIC: Deferred due to Covid19    CRANIAL NERVES:  Cranial nerves were normal.      CN 2- KRISTA, visual fields full to confrontation.      CN 3, 4, 6-  EOMs normal alignment, full range of movement, no nystagmus     CN 5- Facial sensation intact bilaterally.      CN 7- Normal and symmetric facial strength. Nasolabial folds symmetric.     CN 8- Hearing intact to finger rub.      CN 9- Palate elevates symmetrically.      CN 11- Normal strength of shoulder shrug and neck turning      CN 12- Tongue midline, with normal bulk and strength; no fasciculations.     MOTOR:  Motor exam was normal. Muscle bulk was normal in both upper and  lower extremities. Muscle strength was 5/5 in distal and proximal muscles in both upper and lower extremities. No fasciculations, tremor or other abnormal movements were present.     REFLEXES:  Right/ Left: Diminished, absent at the ankles  Babinski: toes downgoing to plantar stimulation. No clonus, frontal release signs or other pathologic reflexes present.     SENSORY: Sensory exam was intact to light touch.  Pinprick was reduced distally in the feet, and vibration was reduced but not absent at the toes.    COORDINATION: finger-nose-finger was intact     GAIT: She kate from the chair with some difficulty, had a shuffle to her gait, was slightly wide-based with positive Romberg, but was almost able to tandem, and en bloc turns    MDS UPDRS 1st Score: Motor Examination  Is the patient on medication for treating the symptoms of Parkinson's Disease?: No  Is the patient on Levodopa?: No  Speech: 1  Finger Tapping Right Hand: 1  Finger Tapping Left Hand: 1  Hand Movements- Right Hand: 0  Hand Movements- Left Hand: 0  Pronatiaon-Supination Movments - Right Hand: 0  Pronatiaon-Supination Movments Left Hand: 0  Toe Tapping Right Foot: 0  Toe Tapping - Left Foot: 1  Leg Agility - Right Le  Leg Agility - Left le  Arising from Chair: 1  Gait: 2  Freezing of Gait: 2  Postural Stability: 2  Posture: 1  Global Spontanteity of Movment ( Body Bradykinesia): 0  Postural Tremor - Right Hand: 0  Postural Tremor - Left hand: 0  Kinetic Tremor - Right hand: 1  Kinetic Tremor - Left hand: 1  Rest Tremor Amplitude - RUE: 0  Rest Tremor Amplitude - LUE: 0  Rest Tremor Amplitude - RLE: 0  Rest Tremor Amplitude - LLE: 0  Rest Tremor Amplitude - Lip/Jaw: 0  Constancy of Rest Tremor: 0  Were dyskinesias (chorea or dystonia) present during examination?: No              Assessment/Plan       Elina Mathis is a 89 y.o. year old female here for shuffling gait.  She does indeed have shuffling gait with some freezing especially upon turning,  and en bloc turns.  She has very little parkinsonism in the upper extremities, with no rest tremor.  The lower extremity predominant parkinsonism may be seen in conditions like vascular parkinsonism, early normal pressure hydrocephalus cannot be entirely excluded.  Primary progressive freezing of gait can be considered as well.  She told me she had some memory problems, but was very resistant to my doing any further cognitive testing like MoCA since she is worried about having a more serious type of cognitive deficit.  Nevertheless we will image her brain.  For neuropathy on sensory exam and with positive Romberg, we will complete the neuropathy laboratory workup beyond the blood work that was already done.      Recommendations:  --physical therapy  --I offered levodopa but she does not want to start anything yet  --MRI brain  --4-6 month follow up    Giuseppe Arriola MD, FAAN  [facility]  [facility]  , Neurology  [facility]     A total of 45 minutes were spent, reviewing her records and labs, seeing the patient, placing orders, and on documentation.         [1]  Patient Active Problem List  Diagnosis   • Allergic rhinitis   • Anxiety   • Arthralgia of shoulder region, right   • Atypical chest pain   • Benign essential HTN   • Chest discomfort   • Cold sensation of skin   • Diverticulitis of colon   • Dyspnea   • Dyspnea on exertion   • Esophagitis   • Essential familial hyperlipidemia   • Facet arthropathy   • Frequent headaches   • GERD (gastroesophageal reflux disease)   • Insomnia   • Low back pain   • Multinodular goiter   • Myalgia   • Sciatica   • Seborrhea   • Shoulder tendonitis   • Sinus congestion   • Thyroid nodule, cold   • Thyromegaly   • Sinusitis   [2]  Past Medical History:  Diagnosis Date   • Dizziness    • Personal history of other diseases of the circulatory system      History of hypertension   • Personal history of other diseases of the digestive system     History of esophageal reflux   • Personal history of other endocrine, nutritional and metabolic disease     History of high cholesterol   [3]  No past surgical history on file.  [4]  Family History  Problem Relation Name Age of Onset   • Depression Sister          [1]  Patient Active Problem List  Diagnosis   • Allergic rhinitis   • Anxiety   • Arthralgia of shoulder region, right   • Atypical chest pain   • Benign essential HTN   • Chest discomfort   • Cold sensation of skin   • Diverticulitis of colon   • Dyspnea   • Dyspnea on exertion   • Esophagitis   • Essential familial hyperlipidemia   • Facet arthropathy   • Frequent headaches   • GERD (gastroesophageal reflux disease)   • Insomnia   • Low back pain   • Multinodular goiter   • Myalgia   • Sciatica   • Seborrhea   • Shoulder tendonitis   • Sinus congestion   • Thyroid nodule, cold   • Thyromegaly   • Sinusitis   [2]  Past Medical History:  Diagnosis Date   • Dizziness    • Personal history of other diseases of the circulatory system     History of hypertension   • Personal history of other diseases of the digestive system     History of esophageal reflux   • Personal history of other endocrine, nutritional and metabolic disease     History of high cholesterol   [3]  No past surgical history on file.  [4]  Family History  Problem Relation Name Age of Onset   • Depression Sister

## 2025-06-24 NOTE — PROGRESS NOTES
"[facility]     Subjective     Elina Mathis is a right handed  89 y.o. year old female who presents with Gait Problem.   Visit type: new patient visit     [facility]    Patient Health Questionnaire-2 Score: 0      Previously chest tightness and tiredness that caused trouble walking.  Onset 4-5 months ago of leg heavy feeling.  Change in gait, more unsteady.  No cane, no falls.    Maybe she shuffles a bit.  No hypophonia or micrographia.  No numbness.  She is weak all over.  She has bad \"arthritis in my back\".  No bowel or bladder changes.      NON MOTOR SYMPTOMS  Orthostatic lightheadedness: while standing perhaps  Depression: no  Anxiety: no  REM sleepp behavioral disorder: no  Other sleep disorders: no    Retired .       Problem List[1]   Medical History[2] nothing  Surgical History[3] no  Social History     Socioeconomic History    Marital status: Single     Spouse name: Not on file    Number of children: Not on file    Years of education: Not on file    Highest education level: Not on file   Occupational History    Not on file   Tobacco Use    Smoking status: Never    Smokeless tobacco: Never   Substance and Sexual Activity    Alcohol use: Yes     Alcohol/week: 7.0 standard drinks of alcohol     Types: 7 Glasses of wine per week    Drug use: Never    Sexual activity: Not on file   Other Topics Concern    Not on file   Social History Narrative    Not on file     Social Drivers of Health     Financial Resource Strain: Not on file   Food Insecurity: No Food Insecurity (6/6/2024)    Hunger Vital Sign     Worried About Running Out of Food in the Last Year: Never true     Ran Out of Food in the Last Year: Never true   Transportation Needs: Not on file   Physical Activity: Not on file   Stress: Not on file   Social Connections: Not on file   Intimate Partner Violence: Not on file   Housing Stability: Not on file      Family History[4]  M-Alzheimer's disease  Patient Health " Questionnaire-2 Score: 0          Review of Systems  All other system have been reviewed and are negative for complaint.  Objective     Vitals:    06/24/25 1532   BP: 144/82   BP Location: Left arm   Patient Position: Sitting   BP Cuff Size: Adult   Pulse: 72   Resp: 18   Weight: 63.5 kg (140 lb)        Neurological Exam      GENERAL APPEARANCE:  No distress, alert and cooperative.     CARDIOVASCULAR: Radial pulses +2 and equal.      MENTAL STATE:  Orientation was normal to time, place and person. Recent and remote memory was intact.  She remembered 2 out of 3 objects.  Attention span and concentration were normal. Language testing was intact. Calculation was intact.  She could do 4 out of 5 serial 7 calculations.    OPHTHALMOSCOPIC: Deferred due to Covid19    CRANIAL NERVES:  Cranial nerves were normal.      CN 2- KRISTA, visual fields full to confrontation.      CN 3, 4, 6-  EOMs normal alignment, full range of movement, no nystagmus     CN 5- Facial sensation intact bilaterally.      CN 7- Normal and symmetric facial strength. Nasolabial folds symmetric.     CN 8- Hearing intact to finger rub.      CN 9- Palate elevates symmetrically.      CN 11- Normal strength of shoulder shrug and neck turning      CN 12- Tongue midline, with normal bulk and strength; no fasciculations.     MOTOR:  Motor exam was normal. Muscle bulk was normal in both upper and lower extremities. Muscle strength was 5/5 in distal and proximal muscles in both upper and lower extremities. No fasciculations, tremor or other abnormal movements were present.     REFLEXES:  Right/ Left: Diminished, absent at the ankles  Babinski: toes downgoing to plantar stimulation. No clonus, frontal release signs or other pathologic reflexes present.     SENSORY: Sensory exam was intact to light touch.  Pinprick was reduced distally in the feet, and vibration was reduced but not absent at the toes.    COORDINATION: finger-nose-finger was intact     GAIT: She kate  from the chair with some difficulty, had a shuffle to her gait, was slightly wide-based with positive Romberg, but was almost able to tandem, and en bloc turns    MDS UPDRS 1st Score: Motor Examination  Is the patient on medication for treating the symptoms of Parkinson's Disease?: No  Is the patient on Levodopa?: No  Speech: 1  Finger Tapping Right Hand: 1  Finger Tapping Left Hand: 1  Hand Movements- Right Hand: 0  Hand Movements- Left Hand: 0  Pronatiaon-Supination Movments - Right Hand: 0  Pronatiaon-Supination Movments Left Hand: 0  Toe Tapping Right Foot: 0  Toe Tapping - Left Foot: 1  Leg Agility - Right Le  Leg Agility - Left le  Arising from Chair: 1  Gait: 2  Freezing of Gait: 2  Postural Stability: 2  Posture: 1  Global Spontanteity of Movment ( Body Bradykinesia): 0  Postural Tremor - Right Hand: 0  Postural Tremor - Left hand: 0  Kinetic Tremor - Right hand: 1  Kinetic Tremor - Left hand: 1  Rest Tremor Amplitude - RUE: 0  Rest Tremor Amplitude - LUE: 0  Rest Tremor Amplitude - RLE: 0  Rest Tremor Amplitude - LLE: 0  Rest Tremor Amplitude - Lip/Jaw: 0  Constancy of Rest Tremor: 0  Were dyskinesias (chorea or dystonia) present during examination?: No              Assessment/Plan       Elina Mathis is a 89 y.o. year old female here for shuffling gait.  She does indeed have shuffling gait with some freezing especially upon turning, and en bloc turns.  She has very little parkinsonism in the upper extremities, with no rest tremor.  The lower extremity predominant parkinsonism may be seen in conditions like vascular parkinsonism, early normal pressure hydrocephalus cannot be entirely excluded.  Primary progressive freezing of gait can be considered as well.  She told me she had some memory problems, but was very resistant to my doing any further cognitive testing like MoCA since she is worried about having a more serious type of cognitive deficit.  Nevertheless we will image her brain.  For neuropathy  on sensory exam and with positive Romberg, we will complete the neuropathy laboratory workup beyond the blood work that was already done.      Recommendations:  --physical therapy  --I offered levodopa but she does not want to start anything yet  --MRI brain  --4-6 month follow up    Giuseppe Arriola MD, FAAN  [facility]  [facility]  , Neurology  [facility] [employer]     A total of 45 minutes were spent, reviewing her records and labs, seeing the patient, placing orders, and on documentation.         [1]   Patient Active Problem List  Diagnosis    Allergic rhinitis    Anxiety    Arthralgia of shoulder region, right    Atypical chest pain    Benign essential HTN    Chest discomfort    Cold sensation of skin    Diverticulitis of colon    Dyspnea    Dyspnea on exertion    Esophagitis    Essential familial hyperlipidemia    Facet arthropathy    Frequent headaches    GERD (gastroesophageal reflux disease)    Insomnia    Low back pain    Multinodular goiter    Myalgia    Sciatica    Seborrhea    Shoulder tendonitis    Sinus congestion    Thyroid nodule, cold    Thyromegaly    Sinusitis   [2]   Past Medical History:  Diagnosis Date    Dizziness     Personal history of other diseases of the circulatory system     History of hypertension    Personal history of other diseases of the digestive system     History of esophageal reflux    Personal history of other endocrine, nutritional and metabolic disease     History of high cholesterol   [3] No past surgical history on file.  [4]   Family History  Problem Relation Name Age of Onset    Depression Sister

## 2025-06-25 LAB
ANA SER QL IF: NEGATIVE
VIT B12 SERPL-MCNC: 660 PG/ML (ref 200–1100)

## 2025-06-29 LAB
ALBUMIN: 4 G/DL (ref 3.4–5)
ALPHA 1 GLOBULIN: 0.3 G/DL (ref 0.2–0.6)
ALPHA 2 GLOBULIN: 0.7 G/DL (ref 0.4–1.1)
BETA GLOBULIN: 1 G/DL (ref 0.5–1.2)
GAMMA GLOBULIN: 1.2 G/DL (ref 0.5–1.4)
PATH REVIEW-SERUM PROTEIN ELECTROPHORESIS: NORMAL
PROTEIN ELECTROPHORESIS COMMENT: NORMAL

## 2025-07-02 ENCOUNTER — TELEPHONE (OUTPATIENT)
Dept: NEUROLOGY | Facility: CLINIC | Age: 89
End: 2025-07-02
Payer: COMMERCIAL

## 2025-07-02 NOTE — TELEPHONE ENCOUNTER
----- Message from Giuseppe Arriola sent at 6/30/2025 10:37 AM EDT -----  Normal bloodwork.  UPEP (urine test) still pending.  ----- Message -----  From: Lab, Background User  Sent: 6/24/2025  10:21 PM EDT  To: Giuseppe Arriola MD

## 2025-07-14 NOTE — PROGRESS NOTES
Physical Therapy    Physical Therapy Evaluation and Treatment      Patient Name: Elina Mathis  MRN: [id number]  Today's Date: 7/16/2025    Time Entry:   Time Calculation  Start Time: 1604  Stop Time: 1705  Time Calculation (min): 61 min  PT Evaluation Time Entry  PT Evaluation (Low) Time Entry: 61 2025 0 COPAY, 257 DED, 8300 OOP MAX, VS MED NEC, MC 90 DAY 10/16/2025, NO AUTH     Assessment:  PT Assessment  PT Assessment Results: Decreased strength, Decreased range of motion, Decreased endurance, Impaired balance, Decreased mobility, Pain  Rehab Prognosis: Good   Elina Mathis presents to physical therapy today with chronic gait abnormalities including intermittent shuffling gait with flexed posturing. Pt referred by movement specialist neurologist after workup for PD and has not yet completed her brain MRI. Pt completed 5XSTS & TUG test at age appropriate level and below falls cutoff score. However, her modified CTSIB & FGA assessments indicate that she is at elevated risk for falls. She score 14/30 on FGA, where falls cutoff is 23/30. Therefore, pt will benefit from PT to address her functional mobility and to address her gait quality.     Plan:  OP PT Plan  Treatment/Interventions: Aquatic therapy, Cryotherapy, Education/ Instruction, Gait training, Manual therapy, Neuromuscular re-education, Therapeutic activities, Therapeutic exercises  PT Plan: Skilled PT  PT Frequency: Other (Comment) (1-2x/week)  Duration: 10 visits  Certification Period Start Date: 07/16/25  Certification Period End Date: 10/14/25  Rehab Potential: Good  Plan of Care Agreement: Patient    Current Problem:   1. Abnormal gait        2. 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 She reports she also has chronic LBP. Had worked with pain management for injections to address as well.   Pt reports she feels she has to walk slow & cautious, especially when waking in the morning.     Was referred to neurology from orthopedic- who she saw for chronic R knee pain- concerned about her gait.    May schedule brain MRI. Does not want to know if there are any findings of dementia.    Reports she started taking magnesium glycinate ~3 weeks ago and feels she notices better energy.    Using a lower body ergometer 2x/day for 15 minutes.   Pt reports she has difficulty with walking prolonged distances due to heaviness with breathing.     Per referring provider note (neurology) 6/24/25:   \"Eilna Mathis is a 89 y.o. year old female here for shuffling gait.  She does indeed have shuffling gait with some freezing especially upon turning, and en bloc turns.  She has very little parkinsonism in the upper extremities, with no rest tremor.  The lower extremity predominant parkinsonism may be seen in conditions like vascular parkinsonism, early normal pressure hydrocephalus cannot be entirely excluded.  Primary progressive freezing of gait can be considered as well.  She told me she had some memory problems, but was very resistant to my doing any further cognitive testing like MoCA since she is worried about having a more serious type of cognitive deficit.  Nevertheless we will image her brain.  For neuropathy on sensory exam and with positive Romberg, we will complete the neuropathy laboratory workup beyond the blood work that was already done.       Recommendations:  --physical therapy  --I offered levodopa but she does not want to start anything yet  --MRI brain  --4-6 month follow up\"    Falls/STEADI:   Denies falls.     Pain #: does not rank on VAS  Location: low back  Descriptor: aching  Better: sitting, fwd bending  Worse: standing, prolonged " "walking  Numbness/tingling: does not report  Difficult Activities: walking downstairs without a railing,   Goals: \"To walk better & to have better balance.\"   PMH/surgical history: HTN, OA, back arthritis  Patient Active Problem List   Diagnosis    Allergic rhinitis    Anxiety    Arthralgia of shoulder region, right    Atypical chest pain    Benign essential HTN    Chest discomfort    Cold sensation of skin    Diverticulitis of colon    Dyspnea    Dyspnea on exertion    Esophagitis    Essential familial hyperlipidemia    Facet arthropathy    Frequent headaches    GERD (gastroesophageal reflux disease)    Insomnia    Low back pain    Multinodular goiter    Myalgia    Sciatica    Seborrhea    Shoulder tendonitis    Sinus congestion    Thyroid nodule, cold    Thyromegaly    Sinusitis    Abnormal gait    Unsteadiness on feet     Surgical History[1]  Medical History[2]      Precautions:  Precautions  STEADI Fall Risk Score (The score of 4 or more indicates an increased risk of falling): 4  Vital Signs:  Vital Signs  Heart Rate: 65  SpO2: 97 %  Pain:  Pain Assessment  Pain Assessment: 0-10  0-10 (Numeric) Pain Score:  (does not rank on VAS)  Pain Type: Chronic pain  Pain Location: Back  Pain Orientation: Lower  Home Living:     Prior Level of Function:       Objective   Cognition:   WFL. Occ repetition of commands & clarification for understanding.   General Assessments:  Posture:   Slight flexed spine posturing     Range of Motion:   UE & LE AROM WFL B.     Strength:   Formal MMT not performed, however strength assessed through functional means.     Coordination:   Finger to nose  Heel to shin  Rapid Alternating Movements   Toe tapping  Opposition    Transfers:   Sit <> stand: LISSETTE I     Gait:   Pt ambulated independently demonstrating intermittent shuffling gait pattern and en bloc turning. Intermittent flexed posturing.     Balance:   JULIAN Position Time Held/Observations   Level Surface:     Normal WFL EO & EC   NBOS " "WFL EO, inc sway EC   R Tandem Stance    L Tandem Stance    R SLS    L SLS        Foam:     Normal WFL EO, 25 sec EC   NBOS WFL EO, 14 sec EC   R Tandem Stance    L Tandem Stance    R SLS    L SLS      Tandem walk: 2 steps prior to LOB  Walking with head turns: difficulty maintaining consistent speed    Outcome Measures:  FGA - Functional Gait Assessment  Gait level surface: 1  Change in gait speed: 3  Gait with horizontal head turns: 1  Gait with vertical head turns: 1  Gait and pivot turn: 2  Step over obstacle: 2  Gait with narrow base of support: 0  Gait with eyes closed: 1  Ambulating backwards: 1  Steps: 2  FGA Total Score: 14    Timed Up and Go Test  How many seconds did it take to complete the 5 tasks?: 13.17 seconds (without device)    Other Measures  5x Sit to Stand: 14.22 (with light UE support at knees anteriorly)  Activities - Specific Balance Confidence Scale: 200 (12.5% of self confidence)     EDUCATION:  Outpatient Education  Individual(s) Educated: Patient  Education Provided: Body Mechanics, Fall Risk, Home Exercise Program, POC, Posture  Risk and Benefits Discussed with Patient/Caregiver/Other: yes  Patient/Caregiver Demonstrated Understanding: yes  Plan of Care Discussed and Agreed Upon: yes  Patient Response to Education: Patient/Caregiver Verbalized Understanding of Information    Goals:  By discharge, pt will meet the following goals:     Pt will demonstrate independence with home exercise program.  Pt will tolerate increased exercise without adverse reaction.   Pt will decrease time of 5XSTS by 0.5 seconds.   Pt will decrease time of TUG test by 1 second.  Pt will increase score of FGA by 7 points.   Pt will consistently ambulate independently with more erect posturing and longer step length without shuffling x350ft.   Pt will improve score of ABC by 15%.  Pt will meet self-reported goal of: \"To walk better & to have better balance.\"          [1] No past surgical history on file.  [2]   Past " Medical History:  Diagnosis Date    Dizziness     Personal history of other diseases of the circulatory system     History of hypertension    Personal history of other diseases of the digestive system     History of esophageal reflux    Personal history of other endocrine, nutritional and metabolic disease     History of high cholesterol

## 2025-07-16 ENCOUNTER — EVALUATION (OUTPATIENT)
Dept: PHYSICAL THERAPY | Facility: CLINIC | Age: 89
End: 2025-07-16
Payer: COMMERCIAL

## 2025-07-16 VITALS — OXYGEN SATURATION: 97 % | HEART RATE: 65 BPM

## 2025-07-16 DIAGNOSIS — R26.9 ABNORMAL GAIT: Primary | ICD-10-CM

## 2025-07-16 DIAGNOSIS — R26.81 UNSTEADINESS ON FEET: ICD-10-CM

## 2025-07-16 PROCEDURE — 97161 PT EVAL LOW COMPLEX 20 MIN: CPT | Mod: GP

## 2025-07-16 ASSESSMENT — ENCOUNTER SYMPTOMS
DEPRESSION: 0
LOSS OF SENSATION IN FEET: 0
OCCASIONAL FEELINGS OF UNSTEADINESS: 1

## 2025-07-16 ASSESSMENT — PAIN - FUNCTIONAL ASSESSMENT: PAIN_FUNCTIONAL_ASSESSMENT: 0-10

## 2025-07-22 ENCOUNTER — APPOINTMENT (OUTPATIENT)
Dept: PHYSICAL THERAPY | Facility: CLINIC | Age: 89
End: 2025-07-22
Payer: COMMERCIAL

## 2025-07-22 DIAGNOSIS — R26.9 ABNORMAL GAIT: Primary | ICD-10-CM

## 2025-07-22 DIAGNOSIS — R26.81 UNSTEADINESS ON FEET: ICD-10-CM

## 2025-07-29 ENCOUNTER — APPOINTMENT (OUTPATIENT)
Dept: NEUROLOGY | Facility: CLINIC | Age: 89
End: 2025-07-29
Payer: COMMERCIAL

## 2025-08-11 ENCOUNTER — TREATMENT (OUTPATIENT)
Dept: PHYSICAL THERAPY | Facility: CLINIC | Age: 89
End: 2025-08-11
Payer: COMMERCIAL

## 2025-08-11 DIAGNOSIS — R26.81 UNSTEADINESS ON FEET: ICD-10-CM

## 2025-08-11 DIAGNOSIS — R26.9 ABNORMAL GAIT: ICD-10-CM

## 2025-08-11 PROCEDURE — 97112 NEUROMUSCULAR REEDUCATION: CPT | Mod: GP

## 2025-08-18 ENCOUNTER — TREATMENT (OUTPATIENT)
Dept: PHYSICAL THERAPY | Facility: CLINIC | Age: 89
End: 2025-08-18
Payer: COMMERCIAL

## 2025-08-18 DIAGNOSIS — R26.9 ABNORMAL GAIT: ICD-10-CM

## 2025-08-18 DIAGNOSIS — R26.81 UNSTEADINESS ON FEET: ICD-10-CM

## 2025-08-18 PROCEDURE — 97112 NEUROMUSCULAR REEDUCATION: CPT | Mod: GP,CQ

## 2025-08-19 ENCOUNTER — HOSPITAL ENCOUNTER (OUTPATIENT)
Dept: RADIOLOGY | Facility: CLINIC | Age: 89
Discharge: HOME | End: 2025-08-19
Payer: COMMERCIAL

## 2025-08-19 DIAGNOSIS — R26.89 SHUFFLING GAIT: ICD-10-CM

## 2025-08-19 PROCEDURE — 70551 MRI BRAIN STEM W/O DYE: CPT | Performed by: RADIOLOGY

## 2025-08-19 PROCEDURE — 70551 MRI BRAIN STEM W/O DYE: CPT

## 2025-08-21 ENCOUNTER — RESULTS FOLLOW-UP (OUTPATIENT)
Dept: NEUROLOGY | Facility: CLINIC | Age: 89
End: 2025-08-21
Payer: COMMERCIAL

## 2025-08-22 DIAGNOSIS — F41.9 ANXIETY: ICD-10-CM

## 2025-08-23 RX ORDER — DIAZEPAM 5 MG/1
TABLET ORAL
Qty: 30 TABLET | Refills: 0 | Status: SHIPPED | OUTPATIENT
Start: 2025-08-23

## 2025-08-25 ENCOUNTER — TREATMENT (OUTPATIENT)
Dept: PHYSICAL THERAPY | Facility: CLINIC | Age: 89
End: 2025-08-25
Payer: COMMERCIAL

## 2025-08-25 DIAGNOSIS — R26.81 UNSTEADINESS ON FEET: ICD-10-CM

## 2025-08-25 DIAGNOSIS — R26.9 ABNORMAL GAIT: ICD-10-CM

## 2025-08-25 PROCEDURE — 97112 NEUROMUSCULAR REEDUCATION: CPT | Mod: GP

## 2025-08-25 PROCEDURE — 97110 THERAPEUTIC EXERCISES: CPT | Mod: GP

## 2025-08-29 ENCOUNTER — OFFICE VISIT (OUTPATIENT)
Facility: CLINIC | Age: 89
End: 2025-08-29
Payer: COMMERCIAL

## 2025-08-29 VITALS — WEIGHT: 136 LBS | HEIGHT: 65 IN | BODY MASS INDEX: 22.66 KG/M2

## 2025-08-29 DIAGNOSIS — J34.89 SINUS PRESSURE: ICD-10-CM

## 2025-08-29 DIAGNOSIS — J34.3 HYPERTROPHY OF NASAL TURBINATES: Primary | ICD-10-CM

## 2025-08-29 DIAGNOSIS — R06.89 DIFFICULTY BREATHING: ICD-10-CM

## 2025-08-29 DIAGNOSIS — R09.82 PND (POST-NASAL DRIP): ICD-10-CM

## 2025-08-29 DIAGNOSIS — J31.0 RHINITIS, UNSPECIFIED TYPE: ICD-10-CM

## 2025-08-29 DIAGNOSIS — J34.89 NASAL DRAINAGE: ICD-10-CM

## 2025-08-29 PROCEDURE — 1036F TOBACCO NON-USER: CPT | Performed by: PHYSICIAN ASSISTANT

## 2025-08-29 PROCEDURE — 1126F AMNT PAIN NOTED NONE PRSNT: CPT | Performed by: PHYSICIAN ASSISTANT

## 2025-08-29 PROCEDURE — 99215 OFFICE O/P EST HI 40 MIN: CPT | Performed by: PHYSICIAN ASSISTANT

## 2025-08-29 PROCEDURE — 31231 NASAL ENDOSCOPY DX: CPT | Performed by: PHYSICIAN ASSISTANT

## 2025-08-29 PROCEDURE — 1159F MED LIST DOCD IN RCRD: CPT | Performed by: PHYSICIAN ASSISTANT

## 2025-08-29 RX ORDER — LORATADINE 10 MG/1
10 TABLET ORAL NIGHTLY
Qty: 30 TABLET | Refills: 11 | Status: SHIPPED | OUTPATIENT
Start: 2025-08-29 | End: 2026-08-29

## 2025-08-29 RX ORDER — AZELASTINE 1 MG/ML
2 SPRAY, METERED NASAL 2 TIMES DAILY
Qty: 30 ML | Refills: 11 | Status: SHIPPED | OUTPATIENT
Start: 2025-08-29 | End: 2026-08-29

## 2025-08-29 ASSESSMENT — PAIN SCALES - GENERAL: PAINLEVEL_OUTOF10: 0-NO PAIN

## 2025-09-02 ENCOUNTER — TREATMENT (OUTPATIENT)
Dept: PHYSICAL THERAPY | Facility: CLINIC | Age: 89
End: 2025-09-02
Payer: COMMERCIAL

## 2025-09-02 VITALS — OXYGEN SATURATION: 97 %

## 2025-09-02 DIAGNOSIS — R26.81 UNSTEADINESS ON FEET: ICD-10-CM

## 2025-09-02 DIAGNOSIS — R26.9 ABNORMAL GAIT: ICD-10-CM

## 2025-09-02 PROCEDURE — 97110 THERAPEUTIC EXERCISES: CPT | Mod: GP

## 2025-09-03 ENCOUNTER — TELEPHONE (OUTPATIENT)
Dept: OTOLARYNGOLOGY | Facility: CLINIC | Age: 89
End: 2025-09-03
Payer: COMMERCIAL

## 2025-09-23 ENCOUNTER — APPOINTMENT (OUTPATIENT)
Dept: PRIMARY CARE | Facility: CLINIC | Age: 89
End: 2025-09-23
Payer: COMMERCIAL

## 2025-10-28 ENCOUNTER — APPOINTMENT (OUTPATIENT)
Dept: NEUROLOGY | Facility: CLINIC | Age: 89
End: 2025-10-28
Payer: COMMERCIAL